# Patient Record
Sex: FEMALE | Race: WHITE | NOT HISPANIC OR LATINO
[De-identification: names, ages, dates, MRNs, and addresses within clinical notes are randomized per-mention and may not be internally consistent; named-entity substitution may affect disease eponyms.]

---

## 2021-06-08 ENCOUNTER — RESULT REVIEW (OUTPATIENT)
Age: 26
End: 2021-06-08

## 2021-06-08 ENCOUNTER — APPOINTMENT (OUTPATIENT)
Dept: HEPATOLOGY | Facility: CLINIC | Age: 26
End: 2021-06-08
Payer: COMMERCIAL

## 2021-06-08 VITALS
HEIGHT: 63 IN | TEMPERATURE: 97.5 F | OXYGEN SATURATION: 100 % | WEIGHT: 214 LBS | SYSTOLIC BLOOD PRESSURE: 101 MMHG | HEART RATE: 76 BPM | DIASTOLIC BLOOD PRESSURE: 58 MMHG | BODY MASS INDEX: 37.92 KG/M2

## 2021-06-08 PROBLEM — Z00.00 ENCOUNTER FOR PREVENTIVE HEALTH EXAMINATION: Status: ACTIVE | Noted: 2021-06-08

## 2021-06-08 PROCEDURE — 99072 ADDL SUPL MATRL&STAF TM PHE: CPT

## 2021-06-08 PROCEDURE — 99204 OFFICE O/P NEW MOD 45 MIN: CPT

## 2021-06-09 LAB
A1AT SERPL-MCNC: 117 MG/DL
ALBUMIN SERPL ELPH-MCNC: 4.8 G/DL
ALP BLD-CCNC: 60 U/L
ALT SERPL-CCNC: 88 U/L
ANION GAP SERPL CALC-SCNC: 16 MMOL/L
AST SERPL-CCNC: 53 U/L
BASOPHILS # BLD AUTO: 0.04 K/UL
BASOPHILS NFR BLD AUTO: 0.5 %
BILIRUB SERPL-MCNC: 0.5 MG/DL
BUN SERPL-MCNC: 13 MG/DL
CALCIUM SERPL-MCNC: 10.4 MG/DL
CERULOPLASMIN SERPL-MCNC: 27 MG/DL
CHLORIDE SERPL-SCNC: 101 MMOL/L
CO2 SERPL-SCNC: 22 MMOL/L
CREAT SERPL-MCNC: 0.66 MG/DL
EOSINOPHIL # BLD AUTO: 0.15 K/UL
EOSINOPHIL NFR BLD AUTO: 2 %
FERRITIN SERPL-MCNC: 144 NG/ML
GLUCOSE SERPL-MCNC: 85 MG/DL
HBV CORE IGG+IGM SER QL: NONREACTIVE
HBV SURFACE AB SER QL: NONREACTIVE
HBV SURFACE AG SER QL: NONREACTIVE
HCT VFR BLD CALC: 36 %
HCV AB SER QL: NONREACTIVE
HCV S/CO RATIO: 0.15 S/CO
HEPATITIS A IGG ANTIBODY: REACTIVE
HGB BLD-MCNC: 12.9 G/DL
IMM GRANULOCYTES NFR BLD AUTO: 0.3 %
INR PPP: 1.2 RATIO
IRON SATN MFR SERPL: 30 %
IRON SERPL-MCNC: 105 UG/DL
LYMPHOCYTES # BLD AUTO: 1.97 K/UL
LYMPHOCYTES NFR BLD AUTO: 26.7 %
MAN DIFF?: NORMAL
MCHC RBC-ENTMCNC: 33.4 PG
MCHC RBC-ENTMCNC: 35.8 GM/DL
MCV RBC AUTO: 93.3 FL
MONOCYTES # BLD AUTO: 0.52 K/UL
MONOCYTES NFR BLD AUTO: 7.1 %
NEUTROPHILS # BLD AUTO: 4.67 K/UL
NEUTROPHILS NFR BLD AUTO: 63.4 %
PLATELET # BLD AUTO: 278 K/UL
POTASSIUM SERPL-SCNC: 4.3 MMOL/L
PROT SERPL-MCNC: 7.3 G/DL
PT BLD: 14 SEC
RBC # BLD: 3.86 M/UL
RBC # FLD: 12.6 %
SODIUM SERPL-SCNC: 139 MMOL/L
TIBC SERPL-MCNC: 350 UG/DL
UIBC SERPL-MCNC: 245 UG/DL
WBC # FLD AUTO: 7.37 K/UL

## 2021-06-10 ENCOUNTER — NON-APPOINTMENT (OUTPATIENT)
Age: 26
End: 2021-06-10

## 2021-06-10 LAB
ANA SER IF-ACNC: NEGATIVE
LKM AB SER QL IF: <20.1 UNITS
MITOCHONDRIA AB SER IF-ACNC: NORMAL
SMOOTH MUSCLE AB SER QL IF: ABNORMAL
SOLUBLE LIVER IGG SER IA-ACNC: 0.9
TTG IGG SER IA-ACNC: 1.2 U/ML
TTG IGG SER IA-ACNC: NEGATIVE

## 2021-06-15 LAB
A1AT PHENOTYP SERPL-IMP: NORMAL
A1AT SERPL-MCNC: 114 MG/DL

## 2021-06-28 LAB
LYSOSOMAL ACID LIPASE INTERPRETATION: NORMAL
LYSOSOMAL ACID LIPASE: 130 CD:384473389

## 2021-07-01 ENCOUNTER — APPOINTMENT (OUTPATIENT)
Dept: HEPATOLOGY | Facility: CLINIC | Age: 26
End: 2021-07-01
Payer: COMMERCIAL

## 2021-07-01 VITALS
BODY MASS INDEX: 37.03 KG/M2 | TEMPERATURE: 98.1 F | HEART RATE: 79 BPM | SYSTOLIC BLOOD PRESSURE: 110 MMHG | RESPIRATION RATE: 14 BRPM | WEIGHT: 209 LBS | DIASTOLIC BLOOD PRESSURE: 75 MMHG | HEIGHT: 63 IN

## 2021-07-01 PROCEDURE — 91200 LIVER ELASTOGRAPHY: CPT

## 2021-07-01 PROCEDURE — 99214 OFFICE O/P EST MOD 30 MIN: CPT

## 2021-07-01 PROCEDURE — 99072 ADDL SUPL MATRL&STAF TM PHE: CPT

## 2021-09-22 ENCOUNTER — NON-APPOINTMENT (OUTPATIENT)
Age: 26
End: 2021-09-22

## 2021-10-06 ENCOUNTER — APPOINTMENT (OUTPATIENT)
Dept: HEPATOLOGY | Facility: CLINIC | Age: 26
End: 2021-10-06
Payer: COMMERCIAL

## 2021-10-06 VITALS
DIASTOLIC BLOOD PRESSURE: 77 MMHG | TEMPERATURE: 97.9 F | BODY MASS INDEX: 39.16 KG/M2 | SYSTOLIC BLOOD PRESSURE: 116 MMHG | HEIGHT: 63 IN | HEART RATE: 74 BPM | WEIGHT: 221 LBS | RESPIRATION RATE: 14 BRPM

## 2021-10-06 PROCEDURE — 99214 OFFICE O/P EST MOD 30 MIN: CPT

## 2021-10-06 PROCEDURE — 99072 ADDL SUPL MATRL&STAF TM PHE: CPT

## 2021-10-08 LAB
ALBUMIN SERPL ELPH-MCNC: 4.8 G/DL
ALP BLD-CCNC: 61 U/L
ALT SERPL-CCNC: 97 U/L
ANION GAP SERPL CALC-SCNC: 11 MMOL/L
AST SERPL-CCNC: 76 U/L
BASOPHILS # BLD AUTO: 0.02 K/UL
BASOPHILS NFR BLD AUTO: 0.3 %
BILIRUB SERPL-MCNC: 0.4 MG/DL
BUN SERPL-MCNC: 9 MG/DL
CALCIUM SERPL-MCNC: 9.7 MG/DL
CHLORIDE SERPL-SCNC: 103 MMOL/L
CHOLEST SERPL-MCNC: 79 MG/DL
CO2 SERPL-SCNC: 23 MMOL/L
CREAT SERPL-MCNC: 0.59 MG/DL
EOSINOPHIL # BLD AUTO: 0.19 K/UL
EOSINOPHIL NFR BLD AUTO: 2.7 %
ESTIMATED AVERAGE GLUCOSE: 111 MG/DL
GLUCOSE SERPL-MCNC: 107 MG/DL
HBA1C MFR BLD HPLC: 5.5 %
HCT VFR BLD CALC: 37.4 %
HDLC SERPL-MCNC: 44 MG/DL
HGB BLD-MCNC: 13.1 G/DL
IMM GRANULOCYTES NFR BLD AUTO: 0.1 %
INR PPP: 1.04 RATIO
INSULIN SERPL-MCNC: 32.6 UU/ML
LDLC SERPL CALC-MCNC: 27 MG/DL
LYMPHOCYTES # BLD AUTO: 1.97 K/UL
LYMPHOCYTES NFR BLD AUTO: 28.4 %
MAN DIFF?: NORMAL
MCHC RBC-ENTMCNC: 33.7 PG
MCHC RBC-ENTMCNC: 35 GM/DL
MCV RBC AUTO: 96.1 FL
MONOCYTES # BLD AUTO: 0.52 K/UL
MONOCYTES NFR BLD AUTO: 7.5 %
NEUTROPHILS # BLD AUTO: 4.23 K/UL
NEUTROPHILS NFR BLD AUTO: 61 %
NONHDLC SERPL-MCNC: 35 MG/DL
PLATELET # BLD AUTO: 291 K/UL
POTASSIUM SERPL-SCNC: 4.5 MMOL/L
PROT SERPL-MCNC: 7.2 G/DL
PT BLD: 12.2 SEC
RBC # BLD: 3.89 M/UL
RBC # FLD: 12.3 %
SODIUM SERPL-SCNC: 137 MMOL/L
TRIGL SERPL-MCNC: 40 MG/DL
TSH SERPL-ACNC: 1.5 UIU/ML
WBC # FLD AUTO: 6.94 K/UL

## 2021-11-23 ENCOUNTER — APPOINTMENT (OUTPATIENT)
Dept: HEPATOLOGY | Facility: CLINIC | Age: 26
End: 2021-11-23
Payer: COMMERCIAL

## 2021-11-23 VITALS
TEMPERATURE: 98.7 F | WEIGHT: 225 LBS | HEART RATE: 89 BPM | RESPIRATION RATE: 15 BRPM | OXYGEN SATURATION: 98 % | BODY MASS INDEX: 39.87 KG/M2 | SYSTOLIC BLOOD PRESSURE: 106 MMHG | DIASTOLIC BLOOD PRESSURE: 79 MMHG | HEIGHT: 63 IN

## 2021-11-23 PROCEDURE — 99072 ADDL SUPL MATRL&STAF TM PHE: CPT

## 2021-11-23 PROCEDURE — 99214 OFFICE O/P EST MOD 30 MIN: CPT

## 2022-01-19 ENCOUNTER — NON-APPOINTMENT (OUTPATIENT)
Age: 27
End: 2022-01-19

## 2022-01-19 ENCOUNTER — APPOINTMENT (OUTPATIENT)
Dept: HEPATOLOGY | Facility: CLINIC | Age: 27
End: 2022-01-19
Payer: COMMERCIAL

## 2022-01-19 VITALS
BODY MASS INDEX: 39.51 KG/M2 | DIASTOLIC BLOOD PRESSURE: 74 MMHG | HEIGHT: 63 IN | OXYGEN SATURATION: 98 % | HEART RATE: 76 BPM | TEMPERATURE: 97 F | SYSTOLIC BLOOD PRESSURE: 111 MMHG | WEIGHT: 223 LBS | RESPIRATION RATE: 15 BRPM

## 2022-01-19 PROCEDURE — 99072 ADDL SUPL MATRL&STAF TM PHE: CPT

## 2022-01-19 PROCEDURE — 99214 OFFICE O/P EST MOD 30 MIN: CPT

## 2022-01-19 RX ORDER — SEMAGLUTIDE 0.25 MG/.5ML
0.25 INJECTION, SOLUTION SUBCUTANEOUS WEEKLY
Qty: 1 | Refills: 1 | Status: DISCONTINUED | COMMUNITY
Start: 2021-11-23 | End: 2022-01-19

## 2022-01-25 ENCOUNTER — APPOINTMENT (OUTPATIENT)
Dept: HEPATOLOGY | Facility: CLINIC | Age: 27
End: 2022-01-25

## 2022-01-26 LAB
ALBUMIN SERPL ELPH-MCNC: 4.8 G/DL
ALP BLD-CCNC: 70 U/L
ALT SERPL-CCNC: 132 U/L
ANION GAP SERPL CALC-SCNC: 14 MMOL/L
AST SERPL-CCNC: 98 U/L
BASOPHILS # BLD AUTO: 0.04 K/UL
BASOPHILS NFR BLD AUTO: 0.6 %
BILIRUB SERPL-MCNC: 0.4 MG/DL
BUN SERPL-MCNC: 9 MG/DL
CALCIUM SERPL-MCNC: 9.8 MG/DL
CHLORIDE SERPL-SCNC: 103 MMOL/L
CO2 SERPL-SCNC: 22 MMOL/L
CREAT SERPL-MCNC: 0.7 MG/DL
EOSINOPHIL # BLD AUTO: 0.18 K/UL
EOSINOPHIL NFR BLD AUTO: 2.5 %
ESTIMATED AVERAGE GLUCOSE: 105 MG/DL
GLUCOSE SERPL-MCNC: 99 MG/DL
HBA1C MFR BLD HPLC: 5.3 %
HCT VFR BLD CALC: 41.9 %
HGB BLD-MCNC: 13.8 G/DL
IMM GRANULOCYTES NFR BLD AUTO: 0.3 %
INR PPP: 1.06 RATIO
INSULIN SERPL-MCNC: 21.6 UU/ML
LYMPHOCYTES # BLD AUTO: 2.51 K/UL
LYMPHOCYTES NFR BLD AUTO: 35 %
MAN DIFF?: NORMAL
MCHC RBC-ENTMCNC: 32.5 PG
MCHC RBC-ENTMCNC: 32.9 GM/DL
MCV RBC AUTO: 98.6 FL
MONOCYTES # BLD AUTO: 0.4 K/UL
MONOCYTES NFR BLD AUTO: 5.6 %
NEUTROPHILS # BLD AUTO: 4.02 K/UL
NEUTROPHILS NFR BLD AUTO: 56 %
PLATELET # BLD AUTO: 345 K/UL
POTASSIUM SERPL-SCNC: 4.2 MMOL/L
PROT SERPL-MCNC: 7.8 G/DL
PT BLD: 12.5 SEC
RBC # BLD: 4.25 M/UL
RBC # FLD: 12.9 %
SODIUM SERPL-SCNC: 139 MMOL/L
WBC # FLD AUTO: 7.17 K/UL

## 2022-02-09 ENCOUNTER — APPOINTMENT (OUTPATIENT)
Dept: INTERVENTIONAL RADIOLOGY/VASCULAR | Facility: CLINIC | Age: 27
End: 2022-02-09
Payer: COMMERCIAL

## 2022-02-09 DIAGNOSIS — Z01.818 ENCOUNTER FOR OTHER PREPROCEDURAL EXAMINATION: ICD-10-CM

## 2022-02-09 DIAGNOSIS — Z83.438 FAMILY HISTORY OF OTHER DISORDER OF LIPOPROTEIN METABOLISM AND OTHER LIPIDEMIA: ICD-10-CM

## 2022-02-09 DIAGNOSIS — Z83.79 FAMILY HISTORY OF OTHER DISEASES OF THE DIGESTIVE SYSTEM: ICD-10-CM

## 2022-02-09 DIAGNOSIS — Z78.9 OTHER SPECIFIED HEALTH STATUS: ICD-10-CM

## 2022-02-09 PROCEDURE — XXXXX: CPT | Mod: 1L

## 2022-02-14 ENCOUNTER — NON-APPOINTMENT (OUTPATIENT)
Age: 27
End: 2022-02-14

## 2022-02-14 RX ORDER — SEMAGLUTIDE 1.34 MG/ML
2 INJECTION, SOLUTION SUBCUTANEOUS
Qty: 1.5 | Refills: 0 | Status: DISCONTINUED | COMMUNITY
Start: 2021-12-03 | End: 2022-02-14

## 2022-03-14 ENCOUNTER — NON-APPOINTMENT (OUTPATIENT)
Age: 27
End: 2022-03-14

## 2022-04-26 ENCOUNTER — APPOINTMENT (OUTPATIENT)
Dept: HEPATOLOGY | Facility: CLINIC | Age: 27
End: 2022-04-26
Payer: COMMERCIAL

## 2022-04-26 VITALS
SYSTOLIC BLOOD PRESSURE: 122 MMHG | HEART RATE: 75 BPM | HEIGHT: 64 IN | BODY MASS INDEX: 37.39 KG/M2 | OXYGEN SATURATION: 98 % | TEMPERATURE: 98.3 F | DIASTOLIC BLOOD PRESSURE: 83 MMHG | RESPIRATION RATE: 16 BRPM | WEIGHT: 219 LBS

## 2022-04-26 PROCEDURE — 99214 OFFICE O/P EST MOD 30 MIN: CPT

## 2022-04-26 NOTE — ASSESSMENT
[FreeTextEntry1] : This is a 26-year-old female with past medical history of obesity, compound status for hemochromatosis, presents for follow-up of evaluation for elevated ferritin level as well as hepatic steatosis and mild iron overload on recent MRI.  She also has persistent transaminitis with normal bilirubin level.  She was further evaluated with etiological work-up for underlying chronic liver disease which was essentially unremarkable except for a mild nonspecific low titer positive test results for smooth muscle antibody (1:20).  I suspect patient has nonalcoholic fatty liver disease with ALMEIDA.  Elevated ferritin level is not uncommon in patients with fatty liver disease and likely explain this finding.  She does have significant insulin resistance as measured by cristhian IR (8)\par \par Her FibroScan is suggestive of moderate to severe hepatic steatosis without any clinically significant fibrosis.  She currently remains on Ozempic that was initiated since November serum 2022.\par \par PLAN\par \par #1.  Nonalcoholic fatty liver disease\par I have discussed with him that lifestyle modifications are crucial for management of NAFLD. I recommended gradual weight loss of 5-10% of his body weight through dietary changes and moderate intensity exercise for 20 minutes at least 3 times per week. These changes have been shown to lead to regression or even resolution of steatosis, inflammation, and even fibrosis in some patients.  Patient already has lost a few pounds since last visit.  I have encouraged her to continue in an effort to lose at least 10% of body weight, and recommended her to get involved more in exercises in addition to continuing semaglutide for her weight loss efforts.\par \par Her FibroScan revealed F0 to F1 fibrosis with moderate hepatic steatosis was noted.\par \par She currently remains on Ozempic subcutaneously 1 mg qweekly.  She is tolerating this treatment well.  She has lost about 5 to 6 pounds of weight since initiation of Ozempic.\par \par Will have follow-up labs today that will include CBC, CMP, PT/INR, insulin level, A1c level, lipid profile.  I will also recommended liver elastography prior to her next follow-up visit in 3 months.  She is awaiting a liver biopsy for establishing the baseline fibrosis status and inflammatory status.\par \par \par #2.  Elevated ferritin level\par Patient has not compound heterozygous status for hemochromatosis.  She has mildly elevated ferritin level to the range of 400 which is improved with phlebotomies ( now < 200).  Ferritin level is not uncommon in patients with nonalcoholic fatty liver disease, and likely not pathogenic condition.  Patient however has a strong family history of hemochromatosis in her father and she is concerned about it.  She will continue to remain on phlebotomy schedule to give her ferritin level below 100.\par \par Return to hepatology clinic in 3 months.\par

## 2022-04-26 NOTE — HISTORY OF PRESENT ILLNESS
[FreeTextEntry1] : History based on initial hepatology clinic visit on 8 June 2021:\par \par Ms. KELLY WHALEN a 25 year White female  presents today for  a hepatology appointment. She has been a patient  KENNA RAMIREZ .  She presents to the clinic today accompanied by her mother.\par \par This is a 25-year-old-year-old female who presents for further evaluation for elevated liver test and ferritin level.  Patient has been following at the [facility] for elevated ferritin level.  She has apparently been diagnosed with compound heterozygous for hemochromatosis (C282Y, H63D).  Patient reports that her father has been diagnosed with cirrhosis of the liver about more than 20 years ago.  Her father was also diagnosed with cirrhosis of the liver and type 2 diabetes mellitus.\par \par Patient is morbidly obese otherwise no other medical problems other than as noted above.\par \par Patient currently remains on phlebotomy protocol every 4 weekly and has already performed about 6-7 infections over the last 6 months.  Her ferritin level was in the range of around 400 to initiation of phlebotomies but has now around 100.  Recent blood test results March 2, 2020 was reviewed.  And now his fibrosis is a revealing moderate steatosis, and F0 fibrosis.\par \par Patient also had an MRI done which revealed moderate to severe steatosis and mild increased iron deposits.  No evidence of cirrhosis was noted.\par \par Interval history dated July 1, 2021\par \awa Gonzales presents for hepatology follow-up appointment after being last seen in hepatology clinic on July 2021.  Since last seen patient has lost about 5 pounds of weight with diet and exercise.  She otherwise has no new symptoms today.\par \par We reviewed all the test results since last clinic visit.  Laboratory test results from 2 May 2021 revealed normal CBC, minimal elevation of INR (1.2, suspect presentation under).  Review of his liver function test revealed total bilirubin 1.5, AST 53, ALT 88, alkaline phosphatase 60.  Serum creatinine was 0.66 mg/dL.  Serum sodium 139, potassium 3.3 mmol/L.\par Etiological work-up for underlying chronic liver disease were essentially unremarkable.  Specifically viral serology for hepatitis B were negative including hepatitis B surface antigen, hepatitis B core antibody total, hepatitis B surface antibody.  Hepatitis A IgG antibody was positive suggesting immunity.  Please with transglutaminase IgG antibody was negative.  Autoimmune markers were minimally positive for smooth muscle antibody (1;20, likely nonspecific) but other autoimmune markers such as anti-LK M, mitochondrial antibody, SURINDER, soluble liver antigen antibody were negative.  Additionally alpha-1 antitrypsin level was within normal range.  Lysosomal acid lipase activity was within normal range.  Ferritin level has down trended 244 ng/mL.  \par Patient currently remains on phlebotomy schedule every 3 months.\par Patient had a FibroScan done on July 1, 2021 which revealed median seal with speed of 1.45 m/s which corresponds to a median liver stiffness score of 6.3 kPa and a CAP score of 355 dB/m.  This is consistent with F0 to F1 suggesting none to mild fibrosis and S3, stage III steatosis which is equivalent to about 67% or more of steatosis.\par \par Interval history dated October 6, 2021\awa Gonzales presents for a hepatology follow-up appointment today after being last seen in the hepatology clinic on July 1, 2021.  Since last seen he reports he has been doing well.  However she has gained about 11 pounds of weight since July despite being on diet control.  She reports that she has been trying to cut down her calories and also regularly exercising.\par She also continues to do phlebotomies every 6 weeks due to an elevated ferritin level.  She has not yet vaccinated for hepatitis B and apparently has scheduled an appointment with her PCP.  She is immune to hepatitis A.\par Today, on her follow-up visit she wanted to know if she would be a candidate for any pharmacotherapy.  \par \par Interval history dated November 23, 2021\par steven Patient presents for hepatology follow-up appointment after being last seen in hepatology clinic on October 6, 2021.  Since last seen patient has gained 4 pounds of weight.  She has not been able to do regular exercises and has not been able to maintain a " restricted diet recommendations".  She had 1 session of phlebotomy since last visit.\par Labs reviewed from October 8, 2021 revealed mild dyslipidemia with HDL of 44 mg/dL otherwise normal triglyceride, cholesterol, LDL.  Liver tests revealed persistent elevation with AST 76, ALT 97, alkaline phosphatase 61, total bili 1.4 mg/dL.  She also had additional blood test done on October 29, 2021 which revealed , , alkaline phosphatase 59, total bilirubin 0.5 mg/dL.\par Her serum insulin level was 32.6 microunits/mL.  Fasting blood sugar of 106.  Her MOMA insulin resistance level was 8 suggesting significant insulin resistance.  Her A1c was 5.5%.\par \par Interval history dated January 19th, 2022:\par \par Ms. Whalen presents to clinic today for follow up visit.  She has no complaints at this time.  She was initiated on Ozempic at last visit and has been tolerating it well at 0.25 mg qweekly.  She has lost about 2 lbs since last visit. \par \par There are no new labs to review.  She had her bloodwork drawn earlier today and the results are not back at this time.\par \par Interval history dated April 26, 2022:\par Ms. Whalen presents for hepatology follow-up appointment.  She was last seen in the hepatology clinic on January 19, 2022.  She has been initiated on Ozempic and the dose was increased gradually, and since February 20, 2022, she has been on 1 mg subcutaneously once weekly.  She has been tolerating well.  She has lost about 4 pounds of weight since initiation of Ozempic.  She has not been able to do a strict exercise regimen as of yet.  But she is hopeful that with the weather changing she would be more involved in outdoor exercises.\par We reviewed her labs from 19 January 2022.  This revealed INR 1.06, unremarkable CBC, insulin 21.6 microunits/mL.  Her liver tests were elevated at the time with the total bilirubin 0.4 mg/dL, AST 98, , alkaline phosphatase 70 units/L.  Her hemoglobin A1c was 5.3%.\par She has yet to schedule her liver biopsy.\par

## 2022-04-26 NOTE — PHYSICAL EXAM
[General Appearance - Alert] : alert [General Appearance - In No Acute Distress] : in no acute distress [Sclera] : the sclera and conjunctiva were normal [PERRL With Normal Accommodation] : pupils were equal in size, round, and reactive to light [Extraocular Movements] : extraocular movements were intact [Outer Ear] : the ears and nose were normal in appearance [Oropharynx] : the oropharynx was normal [Neck Appearance] : the appearance of the neck was normal [Neck Cervical Mass (___cm)] : no neck mass was observed [Jugular Venous Distention Increased] : there was no jugular-venous distention [Thyroid Diffuse Enlargement] : the thyroid was not enlarged [Thyroid Nodule] : there were no palpable thyroid nodules [Auscultation Breath Sounds / Voice Sounds] : lungs were clear to auscultation bilaterally [Heart Rate And Rhythm] : heart rate was normal and rhythm regular [Heart Sounds] : normal S1 and S2 [Heart Sounds Gallop] : no gallops [Murmurs] : no murmurs [Heart Sounds Pericardial Friction Rub] : no pericardial rub [Bowel Sounds] : normal bowel sounds [Abdomen Soft] : soft [Abdomen Tenderness] : non-tender [] : no hepato-splenomegaly [Abdomen Mass (___ Cm)] : no abdominal mass palpated [Deep Tendon Reflexes (DTR)] : deep tendon reflexes were 2+ and symmetric [Sensation] : the sensory exam was normal to light touch and pinprick [No Focal Deficits] : no focal deficits [Oriented To Time, Place, And Person] : oriented to person, place, and time [Impaired Insight] : insight and judgment were intact [Affect] : the affect was normal

## 2022-04-29 LAB
ALBUMIN SERPL ELPH-MCNC: 4.5 G/DL
ALP BLD-CCNC: 73 U/L
ALT SERPL-CCNC: 107 U/L
ANION GAP SERPL CALC-SCNC: 12 MMOL/L
AST SERPL-CCNC: 83 U/L
BASOPHILS # BLD AUTO: 0.04 K/UL
BASOPHILS NFR BLD AUTO: 0.6 %
BILIRUB SERPL-MCNC: 0.4 MG/DL
BUN SERPL-MCNC: 10 MG/DL
CALCIUM SERPL-MCNC: 9.3 MG/DL
CHLORIDE SERPL-SCNC: 105 MMOL/L
CHOLEST SERPL-MCNC: 70 MG/DL
CO2 SERPL-SCNC: 20 MMOL/L
CREAT SERPL-MCNC: 0.58 MG/DL
EGFR: 130 ML/MIN/1.73M2
EOSINOPHIL # BLD AUTO: 0.32 K/UL
EOSINOPHIL NFR BLD AUTO: 4.7 %
ESTIMATED AVERAGE GLUCOSE: 111 MG/DL
GLUCOSE SERPL-MCNC: 101 MG/DL
HBA1C MFR BLD HPLC: 5.5 %
HCT VFR BLD CALC: 41 %
HDLC SERPL-MCNC: 39 MG/DL
HGB BLD-MCNC: 13.9 G/DL
IMM GRANULOCYTES NFR BLD AUTO: 0.4 %
INR PPP: 1.02 RATIO
INSULIN SERPL-MCNC: 28.2 UU/ML
LDLC SERPL CALC-MCNC: 24 MG/DL
LYMPHOCYTES # BLD AUTO: 1.81 K/UL
LYMPHOCYTES NFR BLD AUTO: 26.9 %
MAN DIFF?: NORMAL
MCHC RBC-ENTMCNC: 32.5 PG
MCHC RBC-ENTMCNC: 33.9 GM/DL
MCV RBC AUTO: 95.8 FL
MONOCYTES # BLD AUTO: 0.43 K/UL
MONOCYTES NFR BLD AUTO: 6.4 %
NEUTROPHILS # BLD AUTO: 4.11 K/UL
NEUTROPHILS NFR BLD AUTO: 61 %
NONHDLC SERPL-MCNC: 31 MG/DL
PLATELET # BLD AUTO: 257 K/UL
POTASSIUM SERPL-SCNC: 4.6 MMOL/L
PROT SERPL-MCNC: 7.2 G/DL
PT BLD: 12 SEC
RBC # BLD: 4.28 M/UL
RBC # FLD: 13.1 %
SODIUM SERPL-SCNC: 137 MMOL/L
TRIGL SERPL-MCNC: 36 MG/DL
WBC # FLD AUTO: 6.74 K/UL

## 2022-07-26 ENCOUNTER — APPOINTMENT (OUTPATIENT)
Dept: HEPATOLOGY | Facility: CLINIC | Age: 27
End: 2022-07-26

## 2022-07-26 ENCOUNTER — NON-APPOINTMENT (OUTPATIENT)
Age: 27
End: 2022-07-26

## 2022-07-26 VITALS
OXYGEN SATURATION: 99 % | HEART RATE: 72 BPM | HEIGHT: 64 IN | DIASTOLIC BLOOD PRESSURE: 77 MMHG | BODY MASS INDEX: 37.56 KG/M2 | WEIGHT: 220 LBS | SYSTOLIC BLOOD PRESSURE: 112 MMHG | TEMPERATURE: 98.1 F | RESPIRATION RATE: 16 BRPM

## 2022-07-26 PROCEDURE — 91200 LIVER ELASTOGRAPHY: CPT

## 2022-07-26 PROCEDURE — 99214 OFFICE O/P EST MOD 30 MIN: CPT

## 2022-07-26 RX ORDER — SEMAGLUTIDE 1.34 MG/ML
4 INJECTION, SOLUTION SUBCUTANEOUS
Qty: 1 | Refills: 2 | Status: DISCONTINUED | COMMUNITY
Start: 2022-02-14 | End: 2022-07-26

## 2022-07-26 NOTE — HISTORY OF PRESENT ILLNESS
[FreeTextEntry1] : History based on initial hepatology clinic visit on 8 June 2021:\par \par Ms. KELLY WHALEN a 25 year White female  presents today for  a hepatology appointment. She has been a patient  KENNA RAMIREZ .  She presents to the clinic today accompanied by her mother.\par \par This is a 25-year-old-year-old female who presents for further evaluation for elevated liver test and ferritin level.  Patient has been following at the [facility] for elevated ferritin level.  She has apparently been diagnosed with compound heterozygous for hemochromatosis (C282Y, H63D).  Patient reports that her father has been diagnosed with cirrhosis of the liver about more than 20 years ago.  Her father was also diagnosed with cirrhosis of the liver and type 2 diabetes mellitus.\par \par Patient is morbidly obese otherwise no other medical problems other than as noted above.\par \par Patient currently remains on phlebotomy protocol every 4 weekly and has already performed about 6-7 infections over the last 6 months.  Her ferritin level was in the range of around 400 to initiation of phlebotomies but has now around 100.  Recent blood test results March 2, 2020 was reviewed.  And now his fibrosis is a revealing moderate steatosis, and F0 fibrosis.\par \par Patient also had an MRI done which revealed moderate to severe steatosis and mild increased iron deposits.  No evidence of cirrhosis was noted.\par \par Interval history dated July 1, 2021\par \awa Gonzales presents for hepatology follow-up appointment after being last seen in hepatology clinic on July 2021.  Since last seen patient has lost about 5 pounds of weight with diet and exercise.  She otherwise has no new symptoms today.\par \par We reviewed all the test results since last clinic visit.  Laboratory test results from 2 May 2021 revealed normal CBC, minimal elevation of INR (1.2, suspect presentation under).  Review of his liver function test revealed total bilirubin 1.5, AST 53, ALT 88, alkaline phosphatase 60.  Serum creatinine was 0.66 mg/dL.  Serum sodium 139, potassium 3.3 mmol/L.\par Etiological work-up for underlying chronic liver disease were essentially unremarkable.  Specifically viral serology for hepatitis B were negative including hepatitis B surface antigen, hepatitis B core antibody total, hepatitis B surface antibody.  Hepatitis A IgG antibody was positive suggesting immunity.  Please with transglutaminase IgG antibody was negative.  Autoimmune markers were minimally positive for smooth muscle antibody (1;20, likely nonspecific) but other autoimmune markers such as anti-LK M, mitochondrial antibody, SURINDER, soluble liver antigen antibody were negative.  Additionally alpha-1 antitrypsin level was within normal range.  Lysosomal acid lipase activity was within normal range.  Ferritin level has down trended 244 ng/mL.  \par Patient currently remains on phlebotomy schedule every 3 months.\par Patient had a FibroScan done on July 1, 2021 which revealed median seal with speed of 1.45 m/s which corresponds to a median liver stiffness score of 6.3 kPa and a CAP score of 355 dB/m.  This is consistent with F0 to F1 suggesting none to mild fibrosis and S3, stage III steatosis which is equivalent to about 67% or more of steatosis.\par \par Interval history dated October 6, 2021\awa Gonzales presents for a hepatology follow-up appointment today after being last seen in the hepatology clinic on July 1, 2021.  Since last seen he reports he has been doing well.  However she has gained about 11 pounds of weight since July despite being on diet control.  She reports that she has been trying to cut down her calories and also regularly exercising.\par She also continues to do phlebotomies every 6 weeks due to an elevated ferritin level.  She has not yet vaccinated for hepatitis B and apparently has scheduled an appointment with her PCP.  She is immune to hepatitis A.\par Today, on her follow-up visit she wanted to know if she would be a candidate for any pharmacotherapy.  \par \par Interval history dated November 23, 2021\par steven Patient presents for hepatology follow-up appointment after being last seen in hepatology clinic on October 6, 2021.  Since last seen patient has gained 4 pounds of weight.  She has not been able to do regular exercises and has not been able to maintain a " restricted diet recommendations".  She had 1 session of phlebotomy since last visit.\par Labs reviewed from October 8, 2021 revealed mild dyslipidemia with HDL of 44 mg/dL otherwise normal triglyceride, cholesterol, LDL.  Liver tests revealed persistent elevation with AST 76, ALT 97, alkaline phosphatase 61, total bili 1.4 mg/dL.  She also had additional blood test done on October 29, 2021 which revealed , , alkaline phosphatase 59, total bilirubin 0.5 mg/dL.\par Her serum insulin level was 32.6 microunits/mL.  Fasting blood sugar of 106.  Her MOMA insulin resistance level was 8 suggesting significant insulin resistance.  Her A1c was 5.5%.\par \par Interval history dated January 19th, 2022:\par \par Ms. Whalen presents to clinic today for follow up visit.  She has no complaints at this time.  She was initiated on Ozempic at last visit and has been tolerating it well at 0.25 mg qweekly.  She has lost about 2 lbs since last visit. \par \par There are no new labs to review.  She had her bloodwork drawn earlier today and the results are not back at this time.\par \par Interval history dated April 26, 2022:\par Ms. Whalen presents for hepatology follow-up appointment.  She was last seen in the hepatology clinic on January 19, 2022.  She has been initiated on Ozempic and the dose was increased gradually, and since February 20, 2022, she has been on 1 mg subcutaneously once weekly.  She has been tolerating well.  She has lost about 4 pounds of weight since initiation of Ozempic.  She has not been able to do a strict exercise regimen as of yet.  But she is hopeful that with the weather changing she would be more involved in outdoor exercises.\par We reviewed her labs from 19 January 2022.  This revealed INR 1.06, unremarkable CBC, insulin 21.6 microunits/mL.  Her liver tests were elevated at the time with the total bilirubin 0.4 mg/dL, AST 98, , alkaline phosphatase 70 units/L.  Her hemoglobin A1c was 5.3%.\par She has yet to schedule her liver biopsy.\par \par Interval hx dated July 26, 2022:\par \par Ms. Whalen returns for a follow up. She reports she has been tolerating Ozempic 1 mg sq weekly. She has been walking at least 45 minutes a day. She has also changed her diet, and has cut down on the proportion of her diet by 50%. She has lost about 5 pounds since January. \par \par Follow up FibroScan shows CAP score of 357 and LSM of 9.1 from July 26, 2022.   Labs reviewed as underneath from April 2022. \par \par \par \par

## 2022-07-26 NOTE — ASSESSMENT
[FreeTextEntry1] : This is a 26-year-old female with past medical history of obesity, compound status for hemochromatosis, presents for follow-up of evaluation for elevated ferritin level as well as hepatic steatosis and mild iron overload on recent MRI.  She also has persistent transaminitis with normal bilirubin level.  She was further evaluated with etiological work-up for underlying chronic liver disease which was essentially unremarkable except for a mild nonspecific low titer positive test results for smooth muscle antibody (1:20).  I suspect patient has nonalcoholic fatty liver disease with ALMEIDA.  Elevated ferritin level is not uncommon in patients with fatty liver disease and likely explain this finding.  She does have significant insulin resistance as measured by cristhian IR (8)\par \par Her FibroScan is suggestive of moderate to severe hepatic steatosis without any clinically significant fibrosis. Follow up FibroScan shows CAP score of 357 and LSM of 9.1 from July 26, 2022.   She currently uptitrated to  Ozempic 1 mg weekly that was initiated since November serum 2022. She has been still struggling to lose weight.\par \par PLAN\par \par #1.  Nonalcoholic fatty liver disease\par I have discussed with him that lifestyle modifications are crucial for management of NAFLD. I recommended gradual weight loss of 5-10% of his body weight through dietary changes and moderate intensity exercise for 20 minutes at least 3 times per week. These changes have been shown to lead to regression or even resolution of steatosis, inflammation, and even fibrosis in some patients.  Patient already has lost a few pounds since last visit.  I have encouraged her to continue in an effort to lose at least 10% of body weight, and recommended her to get involved more in exercises in addition to continuing semaglutide for her weight loss efforts.\par \par Her FibroScan revealed F0 to F1 fibrosis with moderate hepatic steatosis was noted.\par \par She currently remains on Ozempic subcutaneously 1 mg qweekly.  She is tolerating this treatment well.  She has lost about 5 to 6 pounds of weight since initiation of Ozempic. We will plan to increase top 2 mg sq weekly pending  insurance authorization.\par \par Will have follow-up labs today that will include CBC, CMP, PT/INR, insulin level, A1c level, lipid profile.  I will also recommended liver elastography prior to her next follow-up visit in 3 months.  She is awaiting a liver biopsy for establishing the baseline fibrosis status and inflammatory status. Still awaiting scheduling. \par \par \par #2.  Elevated ferritin level\par Patient has not compound heterozygous status for hemochromatosis.  She has mildly elevated ferritin level to the range of 400 which is improved with phlebotomies ( now < 200).  Ferritin level is not uncommon in patients with nonalcoholic fatty liver disease, and likely not pathogenic condition.  Patient however has a strong family history of hemochromatosis in her father and she is concerned about it.  She will continue to remain on phlebotomy schedule to give her ferritin level below 100.\par \par Return to hepatology clinic in 3 months.\par

## 2022-07-29 LAB
ALBUMIN SERPL ELPH-MCNC: 4.6 G/DL
ALP BLD-CCNC: 71 U/L
ALT SERPL-CCNC: 94 U/L
ANION GAP SERPL CALC-SCNC: 14 MMOL/L
AST SERPL-CCNC: 78 U/L
BASOPHILS # BLD AUTO: 0.05 K/UL
BASOPHILS NFR BLD AUTO: 0.7 %
BILIRUB SERPL-MCNC: 0.4 MG/DL
BUN SERPL-MCNC: 8 MG/DL
CALCIUM SERPL-MCNC: 9.5 MG/DL
CHLORIDE SERPL-SCNC: 101 MMOL/L
CHOLEST SERPL-MCNC: 76 MG/DL
CO2 SERPL-SCNC: 23 MMOL/L
CREAT SERPL-MCNC: 0.62 MG/DL
EGFR: 127 ML/MIN/1.73M2
EOSINOPHIL # BLD AUTO: 0.28 K/UL
EOSINOPHIL NFR BLD AUTO: 3.9 %
ESTIMATED AVERAGE GLUCOSE: 108 MG/DL
FERRITIN SERPL-MCNC: 77 NG/ML
GLUCOSE SERPL-MCNC: 79 MG/DL
HBA1C MFR BLD HPLC: 5.4 %
HCT VFR BLD CALC: 39.7 %
HDLC SERPL-MCNC: 42 MG/DL
HGB BLD-MCNC: 13.3 G/DL
IMM GRANULOCYTES NFR BLD AUTO: 0.6 %
INR PPP: 1.06 RATIO
INSULIN SERPL-MCNC: 29.6 UU/ML
IRON SATN MFR SERPL: 34 %
IRON SERPL-MCNC: 124 UG/DL
LDLC SERPL CALC-MCNC: 26 MG/DL
LYMPHOCYTES # BLD AUTO: 2.07 K/UL
LYMPHOCYTES NFR BLD AUTO: 28.8 %
MAN DIFF?: NORMAL
MCHC RBC-ENTMCNC: 31.9 PG
MCHC RBC-ENTMCNC: 33.5 GM/DL
MCV RBC AUTO: 95.2 FL
MONOCYTES # BLD AUTO: 0.42 K/UL
MONOCYTES NFR BLD AUTO: 5.8 %
NEUTROPHILS # BLD AUTO: 4.32 K/UL
NEUTROPHILS NFR BLD AUTO: 60.2 %
NONHDLC SERPL-MCNC: 34 MG/DL
PLATELET # BLD AUTO: 288 K/UL
POTASSIUM SERPL-SCNC: 4.3 MMOL/L
PROT SERPL-MCNC: 7.4 G/DL
PT BLD: 12.5 SEC
RBC # BLD: 4.17 M/UL
RBC # FLD: 12.7 %
SODIUM SERPL-SCNC: 138 MMOL/L
TIBC SERPL-MCNC: 359 UG/DL
TRIGL SERPL-MCNC: 40 MG/DL
UIBC SERPL-MCNC: 236 UG/DL
WBC # FLD AUTO: 7.18 K/UL

## 2022-08-24 ENCOUNTER — RESULT REVIEW (OUTPATIENT)
Age: 27
End: 2022-08-24

## 2022-09-01 ENCOUNTER — NON-APPOINTMENT (OUTPATIENT)
Age: 27
End: 2022-09-01

## 2022-10-27 ENCOUNTER — APPOINTMENT (OUTPATIENT)
Dept: HEPATOLOGY | Facility: CLINIC | Age: 27
End: 2022-10-27

## 2022-10-27 VITALS
TEMPERATURE: 97.8 F | WEIGHT: 219 LBS | BODY MASS INDEX: 37.85 KG/M2 | OXYGEN SATURATION: 98 % | HEART RATE: 77 BPM | DIASTOLIC BLOOD PRESSURE: 74 MMHG | RESPIRATION RATE: 16 BRPM | HEIGHT: 63.85 IN | SYSTOLIC BLOOD PRESSURE: 105 MMHG

## 2022-10-27 PROCEDURE — 99214 OFFICE O/P EST MOD 30 MIN: CPT

## 2022-10-27 PROCEDURE — 99072 ADDL SUPL MATRL&STAF TM PHE: CPT

## 2022-10-27 NOTE — HISTORY OF PRESENT ILLNESS
[FreeTextEntry1] : History based on initial hepatology clinic visit on 8 June 2021:\par \par Ms. KELLY WHALEN a 25 year White female  presents today for  a hepatology appointment. She has been a patient  KENNA RAMIREZ .  She presents to the clinic today accompanied by her mother.\par \par This is a 25-year-old-year-old female who presents for further evaluation for elevated liver test and ferritin level.  Patient has been following at the [facility] for elevated ferritin level.  She has apparently been diagnosed with compound heterozygous for hemochromatosis (C282Y, H63D).  Patient reports that her father has been diagnosed with cirrhosis of the liver about more than 20 years ago.  Her father was also diagnosed with cirrhosis of the liver and type 2 diabetes mellitus.\par \par Patient is morbidly obese otherwise no other medical problems other than as noted above.\par \par Patient currently remains on phlebotomy protocol every 4 weekly and has already performed about 6-7 infections over the last 6 months.  Her ferritin level was in the range of around 400 to initiation of phlebotomies but has now around 100.  Recent blood test results March 2, 2020 was reviewed.  And now his fibrosis is a revealing moderate steatosis, and F0 fibrosis.\par \par Patient also had an MRI done which revealed moderate to severe steatosis and mild increased iron deposits.  No evidence of cirrhosis was noted.\par \par Interval history dated July 1, 2021\par \awa Gonzales presents for hepatology follow-up appointment after being last seen in hepatology clinic on July 2021.  Since last seen patient has lost about 5 pounds of weight with diet and exercise.  She otherwise has no new symptoms today.\par \par We reviewed all the test results since last clinic visit.  Laboratory test results from 2 May 2021 revealed normal CBC, minimal elevation of INR (1.2, suspect presentation under).  Review of his liver function test revealed total bilirubin 1.5, AST 53, ALT 88, alkaline phosphatase 60.  Serum creatinine was 0.66 mg/dL.  Serum sodium 139, potassium 3.3 mmol/L.\par Etiological work-up for underlying chronic liver disease were essentially unremarkable.  Specifically viral serology for hepatitis B were negative including hepatitis B surface antigen, hepatitis B core antibody total, hepatitis B surface antibody.  Hepatitis A IgG antibody was positive suggesting immunity.  Please with transglutaminase IgG antibody was negative.  Autoimmune markers were minimally positive for smooth muscle antibody (1;20, likely nonspecific) but other autoimmune markers such as anti-LK M, mitochondrial antibody, SURINDER, soluble liver antigen antibody were negative.  Additionally alpha-1 antitrypsin level was within normal range.  Lysosomal acid lipase activity was within normal range.  Ferritin level has down trended 244 ng/mL.  \par Patient currently remains on phlebotomy schedule every 3 months.\par Patient had a FibroScan done on July 1, 2021 which revealed median seal with speed of 1.45 m/s which corresponds to a median liver stiffness score of 6.3 kPa and a CAP score of 355 dB/m.  This is consistent with F0 to F1 suggesting none to mild fibrosis and S3, stage III steatosis which is equivalent to about 67% or more of steatosis.\par \par Interval history dated October 6, 2021\awa Gonzales presents for a hepatology follow-up appointment today after being last seen in the hepatology clinic on July 1, 2021.  Since last seen he reports he has been doing well.  However she has gained about 11 pounds of weight since July despite being on diet control.  She reports that she has been trying to cut down her calories and also regularly exercising.\par She also continues to do phlebotomies every 6 weeks due to an elevated ferritin level.  She has not yet vaccinated for hepatitis B and apparently has scheduled an appointment with her PCP.  She is immune to hepatitis A.\par Today, on her follow-up visit she wanted to know if she would be a candidate for any pharmacotherapy.  \par \par Interval history dated November 23, 2021\par steven Patient presents for hepatology follow-up appointment after being last seen in hepatology clinic on October 6, 2021.  Since last seen patient has gained 4 pounds of weight.  She has not been able to do regular exercises and has not been able to maintain a " restricted diet recommendations".  She had 1 session of phlebotomy since last visit.\par Labs reviewed from October 8, 2021 revealed mild dyslipidemia with HDL of 44 mg/dL otherwise normal triglyceride, cholesterol, LDL.  Liver tests revealed persistent elevation with AST 76, ALT 97, alkaline phosphatase 61, total bili 1.4 mg/dL.  She also had additional blood test done on October 29, 2021 which revealed , , alkaline phosphatase 59, total bilirubin 0.5 mg/dL.\par Her serum insulin level was 32.6 microunits/mL.  Fasting blood sugar of 106.  Her MOMA insulin resistance level was 8 suggesting significant insulin resistance.  Her A1c was 5.5%.\par \par Interval history dated January 19th, 2022:\par \par Ms. Whalen presents to clinic today for follow up visit.  She has no complaints at this time.  She was initiated on Ozempic at last visit and has been tolerating it well at 0.25 mg qweekly.  She has lost about 2 lbs since last visit. \par \par There are no new labs to review.  She had her bloodwork drawn earlier today and the results are not back at this time.\par \par Interval history dated April 26, 2022:\par Ms. Whalen presents for hepatology follow-up appointment.  She was last seen in the hepatology clinic on January 19, 2022.  She has been initiated on Ozempic and the dose was increased gradually, and since February 20, 2022, she has been on 1 mg subcutaneously once weekly.  She has been tolerating well.  She has lost about 4 pounds of weight since initiation of Ozempic.  She has not been able to do a strict exercise regimen as of yet.  But she is hopeful that with the weather changing she would be more involved in outdoor exercises.\par We reviewed her labs from 19 January 2022.  This revealed INR 1.06, unremarkable CBC, insulin 21.6 microunits/mL.  Her liver tests were elevated at the time with the total bilirubin 0.4 mg/dL, AST 98, , alkaline phosphatase 70 units/L.  Her hemoglobin A1c was 5.3%.\par She has yet to schedule her liver biopsy.\par \par Interval hx dated July 26, 2022:\par \par Ms. Whalen returns for a follow up. She reports she has been tolerating Ozempic 1 mg sq weekly. She has been walking at least 45 minutes a day. She has also changed her diet, and has cut down on the proportion of her diet by 50%. She has lost about 5 pounds since January. \par \par Follow up FibroScan shows CAP score of 357 and LSM of 9.1 from July 26, 2022.   Labs reviewed as underneath from April 2022. \par \par Interval hx dated October 27, 2022:\par \par Ms. Whalen returns for follow-up visit. She states that she is tolerating the 1mg Ozempic (2mg is on backorder). She complains of lack of appetite but is still struggling to lose weight. She is walking and bike riding for exercise. She denies N/V/D/C.  She denies periods of confusion, pruritus, abdominal pain. She still hasn't had liver biopsy which was recommended during her last visit. She is having phlebotomy procedures about every 8 weeks for high Ferritin level and hemochromatosis (compound heterozygous for C282Y and H63D).\par \par We reviewed her prior blood test results from July 29, 2022 which revealed unremarkable CBC, total bilirubin 0.4 mg/dL, AST 78, ALT 94, alkaline phosphatase 71.  She continues to have persistent elevation of liver test despite being on Ozempic with some weight loss over the last 8 to 9 months.  Her lipid profile is essentially within normal range although HDL was 42 mg/dL.  Iron profile revealed serum iron 124 mcg/dL, TIBC 359 mcg/dL, percentage saturation 34% and ferritin level 77 ng/mL.  Her serum insulin level was 29.6.  INR was 1.06.  Hemoglobin A1c was 5.4%.\par \par Her last FibroScan was from July 26, 2022 which revealed a CAP score of 357 dB/m and liver stiffness score of 9.1 kPa which is suggestive of S3–stage III steatosis, F2–moderate fibrosis.\par \par \par

## 2022-10-27 NOTE — ASSESSMENT
[FreeTextEntry1] : This is a 26-year-old female with past medical history of obesity, compound heterozygous for hemochromatosis mutation (C282Y, H63D), presents for follow-up.  She also has history of elevated ferritin level as well as hepatic steatosis and mild iron overload on recent MRI.  She has persistent transaminitis with normal bilirubin level despite being on Ozempic (being prescribed for weight loss).  She was further evaluated with etiological work-up for underlying chronic liver disease which was essentially unremarkable except for a mild nonspecific low titer positive test results for smooth muscle antibody (1:20).  \par I suspect patient has nonalcoholic fatty liver disease with ALMEIDA.  Elevated ferritin level is not uncommon in patients with fatty liver disease and likely explain this finding.  However she does have compound heterozygous for hemochromatosis which may explain her high ferritin level in the past.  She does have significant insulin resistance as measured by cristhian IR (8)\par \par Her FibroScan is suggestive of moderate to severe hepatic steatosis without any clinically significant fibrosis. Follow up FibroScan shows CAP score of 357 and LSM of 9.1 from July 26, 2022.   She currently uptitrated to  Ozempic 1 mg weekly that was initiated since November serum 2022. She has been still struggling to lose weight.\par \par PLAN\par \par #1.  Nonalcoholic fatty liver disease\par I have discussed with her that lifestyle modifications are crucial for management of NAFLD. I recommended gradual weight loss of 5-10% of his body weight through dietary changes and moderate intensity exercise for 20 minutes at least 3 times per week. These changes have been shown to lead to regression or even resolution of steatosis, inflammation, and even fibrosis in some patients.  Patient already has lost a few pounds since last visit.  I have encouraged her to continue in an effort to lose at least 10% of body weight, and recommended her to get involved more in exercises in addition to continuing semaglutide for her weight loss efforts.\par \par Her FibroScan revealed F2 fibrosis with moderate hepatic steatosis was noted.\par \par She currently remains on Ozempic subcutaneously 1 mg q weekly.  She is tolerating this treatment well.  She has lost about 5 to 6 pounds of weight since initiation of Ozempic. We will plan to increase to 2 mg sq weekly pending  pharmacy availability (currently Ozempic is on backorder)\par \par Will have follow-up labs today that will include CBC, CMP, PT/INR, insulin level, lipid profile.\par \par She is awaiting a liver biopsy for establishing the baseline fibrosis status.  In addition patient has persistent elevation of liver test despite being on Ozempic and she also had this compound heterozygous status for hemochromatosis and I am expecting liver biopsy will throw some light especially to rule out any evidence of iron overload in her liver.  Still awaiting scheduling.\par \par \par #2.  Elevated ferritin level\par Patient has not compound heterozygous status for hemochromatosis.  She has mildly elevated ferritin level to the range of 400 which is improved with phlebotomies ( now < 100).  Ferritin level is not uncommon in patients with nonalcoholic fatty liver disease, and likely not pathogenic condition.  Patient however has a strong family history of hemochromatosis (father side) and she is concerned about it.  She will continue to remain on phlebotomy schedule to give her ferritin level below 100.\par \par Return to hepatology clinic in 3 months.\par

## 2022-10-27 NOTE — PHYSICAL EXAM
[General Appearance - Alert] : alert [General Appearance - In No Acute Distress] : in no acute distress [Sclera] : the sclera and conjunctiva were normal [PERRL With Normal Accommodation] : pupils were equal in size, round, and reactive to light [Extraocular Movements] : extraocular movements were intact [Outer Ear] : the ears and nose were normal in appearance [Oropharynx] : the oropharynx was normal [Neck Appearance] : the appearance of the neck was normal [Neck Cervical Mass (___cm)] : no neck mass was observed [Jugular Venous Distention Increased] : there was no jugular-venous distention [Thyroid Diffuse Enlargement] : the thyroid was not enlarged [Thyroid Nodule] : there were no palpable thyroid nodules [Auscultation Breath Sounds / Voice Sounds] : lungs were clear to auscultation bilaterally [Heart Rate And Rhythm] : heart rate was normal and rhythm regular [Heart Sounds] : normal S1 and S2 [Heart Sounds Gallop] : no gallops [Murmurs] : no murmurs [Heart Sounds Pericardial Friction Rub] : no pericardial rub [Bowel Sounds] : normal bowel sounds [Abdomen Soft] : soft [Abdomen Tenderness] : non-tender [] : no hepato-splenomegaly [Abdomen Mass (___ Cm)] : no abdominal mass palpated [Abnormal Walk] : normal gait [Nail Clubbing] : no clubbing  or cyanosis of the fingernails [Musculoskeletal - Swelling] : no joint swelling seen [Motor Tone] : muscle strength and tone were normal [Deep Tendon Reflexes (DTR)] : deep tendon reflexes were 2+ and symmetric [Sensation] : the sensory exam was normal to light touch and pinprick [No Focal Deficits] : no focal deficits [Oriented To Time, Place, And Person] : oriented to person, place, and time [Impaired Insight] : insight and judgment were intact [Affect] : the affect was normal

## 2022-10-31 LAB
ALBUMIN SERPL ELPH-MCNC: 4.6 G/DL
ALP BLD-CCNC: 62 U/L
ALT SERPL-CCNC: 117 U/L
ANION GAP SERPL CALC-SCNC: 13 MMOL/L
AST SERPL-CCNC: 92 U/L
BASOPHILS # BLD AUTO: 0.04 K/UL
BASOPHILS NFR BLD AUTO: 0.6 %
BILIRUB SERPL-MCNC: 0.4 MG/DL
BUN SERPL-MCNC: 8 MG/DL
CALCIUM SERPL-MCNC: 9.5 MG/DL
CHLORIDE SERPL-SCNC: 106 MMOL/L
CHOLEST SERPL-MCNC: 72 MG/DL
CO2 SERPL-SCNC: 22 MMOL/L
CREAT SERPL-MCNC: 0.69 MG/DL
EGFR: 124 ML/MIN/1.73M2
EOSINOPHIL # BLD AUTO: 0.17 K/UL
EOSINOPHIL NFR BLD AUTO: 2.6 %
FERRITIN SERPL-MCNC: 62 NG/ML
GLUCOSE SERPL-MCNC: 95 MG/DL
HCT VFR BLD CALC: 38.9 %
HDLC SERPL-MCNC: 38 MG/DL
HGB BLD-MCNC: 13.2 G/DL
IMM GRANULOCYTES NFR BLD AUTO: 0.3 %
INR PPP: 1.09 RATIO
INSULIN SERPL-MCNC: 22.7 UU/ML
LDLC SERPL CALC-MCNC: 19 MG/DL
LYMPHOCYTES # BLD AUTO: 2.16 K/UL
LYMPHOCYTES NFR BLD AUTO: 33 %
MAN DIFF?: NORMAL
MCHC RBC-ENTMCNC: 33.2 PG
MCHC RBC-ENTMCNC: 33.9 GM/DL
MCV RBC AUTO: 97.7 FL
MONOCYTES # BLD AUTO: 0.43 K/UL
MONOCYTES NFR BLD AUTO: 6.6 %
NEUTROPHILS # BLD AUTO: 3.72 K/UL
NEUTROPHILS NFR BLD AUTO: 56.9 %
NONHDLC SERPL-MCNC: 34 MG/DL
PLATELET # BLD AUTO: 321 K/UL
POTASSIUM SERPL-SCNC: 4.1 MMOL/L
PROT SERPL-MCNC: 7.2 G/DL
PT BLD: 12.8 SEC
RBC # BLD: 3.98 M/UL
RBC # FLD: 13.4 %
SODIUM SERPL-SCNC: 141 MMOL/L
TRIGL SERPL-MCNC: 75 MG/DL
WBC # FLD AUTO: 6.54 K/UL

## 2022-12-19 ENCOUNTER — APPOINTMENT (OUTPATIENT)
Dept: INTERVENTIONAL RADIOLOGY/VASCULAR | Facility: CLINIC | Age: 27
End: 2022-12-19
Payer: COMMERCIAL

## 2022-12-19 PROCEDURE — 99203 OFFICE O/P NEW LOW 30 MIN: CPT | Mod: 95

## 2022-12-19 RX ORDER — SEMAGLUTIDE 1.34 MG/ML
4 INJECTION, SOLUTION SUBCUTANEOUS
Qty: 3 | Refills: 1 | Status: DISCONTINUED | COMMUNITY
Start: 2022-09-01 | End: 2022-12-19

## 2022-12-19 RX ORDER — CYANOCOBALAMIN
1000 KIT
Refills: 0 | Status: ACTIVE | COMMUNITY

## 2023-01-02 ENCOUNTER — OUTPATIENT (OUTPATIENT)
Dept: OUTPATIENT SERVICES | Facility: HOSPITAL | Age: 28
LOS: 1 days | End: 2023-01-02
Payer: COMMERCIAL

## 2023-01-02 DIAGNOSIS — Z11.52 ENCOUNTER FOR SCREENING FOR COVID-19: ICD-10-CM

## 2023-01-02 LAB — SARS-COV-2 RNA SPEC QL NAA+PROBE: SIGNIFICANT CHANGE UP

## 2023-01-02 PROCEDURE — U0003: CPT

## 2023-01-02 PROCEDURE — U0005: CPT

## 2023-01-02 PROCEDURE — C9803: CPT

## 2023-01-03 LAB
ALBUMIN SERPL ELPH-MCNC: 4.4 G/DL
ALP BLD-CCNC: 73 U/L
ALT SERPL-CCNC: 104 U/L
ANION GAP SERPL CALC-SCNC: 14 MMOL/L
AST SERPL-CCNC: 51 U/L
BASOPHILS # BLD AUTO: 0.04 K/UL
BASOPHILS NFR BLD AUTO: 0.6 %
BILIRUB SERPL-MCNC: 0.6 MG/DL
BUN SERPL-MCNC: 11 MG/DL
CALCIUM SERPL-MCNC: 10.2 MG/DL
CHLORIDE SERPL-SCNC: 101 MMOL/L
CO2 SERPL-SCNC: 21 MMOL/L
CREAT SERPL-MCNC: 0.72 MG/DL
EGFR: 119 ML/MIN/1.73M2
EOSINOPHIL # BLD AUTO: 0.23 K/UL
EOSINOPHIL NFR BLD AUTO: 3.4 %
GLUCOSE SERPL-MCNC: 104 MG/DL
HCT VFR BLD CALC: 38.6 %
HGB BLD-MCNC: 13.5 G/DL
IMM GRANULOCYTES NFR BLD AUTO: 0.4 %
INR PPP: 1.03 RATIO
LYMPHOCYTES # BLD AUTO: 2.16 K/UL
LYMPHOCYTES NFR BLD AUTO: 31.6 %
MAN DIFF?: NORMAL
MCHC RBC-ENTMCNC: 32.7 PG
MCHC RBC-ENTMCNC: 35 GM/DL
MCV RBC AUTO: 93.5 FL
MONOCYTES # BLD AUTO: 0.53 K/UL
MONOCYTES NFR BLD AUTO: 7.8 %
NEUTROPHILS # BLD AUTO: 3.84 K/UL
NEUTROPHILS NFR BLD AUTO: 56.2 %
PLATELET # BLD AUTO: 344 K/UL
POTASSIUM SERPL-SCNC: 4.1 MMOL/L
PROT SERPL-MCNC: 7.2 G/DL
PT BLD: 12.2 SEC
RBC # BLD: 4.13 M/UL
RBC # FLD: 13.7 %
SODIUM SERPL-SCNC: 136 MMOL/L
WBC # FLD AUTO: 6.83 K/UL

## 2023-01-04 ENCOUNTER — RESULT REVIEW (OUTPATIENT)
Age: 28
End: 2023-01-04

## 2023-01-04 ENCOUNTER — OUTPATIENT (OUTPATIENT)
Dept: OUTPATIENT SERVICES | Facility: HOSPITAL | Age: 28
LOS: 1 days | End: 2023-01-04
Payer: COMMERCIAL

## 2023-01-04 ENCOUNTER — TRANSCRIPTION ENCOUNTER (OUTPATIENT)
Age: 28
End: 2023-01-04

## 2023-01-04 VITALS
OXYGEN SATURATION: 97 % | TEMPERATURE: 98 F | WEIGHT: 207.9 LBS | DIASTOLIC BLOOD PRESSURE: 86 MMHG | HEIGHT: 63 IN | SYSTOLIC BLOOD PRESSURE: 135 MMHG | HEART RATE: 78 BPM | RESPIRATION RATE: 17 BRPM

## 2023-01-04 VITALS
RESPIRATION RATE: 16 BRPM | SYSTOLIC BLOOD PRESSURE: 120 MMHG | DIASTOLIC BLOOD PRESSURE: 76 MMHG | OXYGEN SATURATION: 99 % | HEART RATE: 75 BPM

## 2023-01-04 DIAGNOSIS — R79.89 OTHER SPECIFIED ABNORMAL FINDINGS OF BLOOD CHEMISTRY: ICD-10-CM

## 2023-01-04 LAB
BLD GP AB SCN SERPL QL: NEGATIVE — SIGNIFICANT CHANGE UP
RH IG SCN BLD-IMP: POSITIVE — SIGNIFICANT CHANGE UP
RH IG SCN BLD-IMP: POSITIVE — SIGNIFICANT CHANGE UP

## 2023-01-04 PROCEDURE — 86901 BLOOD TYPING SEROLOGIC RH(D): CPT

## 2023-01-04 PROCEDURE — 47000 NEEDLE BIOPSY OF LIVER PERQ: CPT

## 2023-01-04 PROCEDURE — 88307 TISSUE EXAM BY PATHOLOGIST: CPT | Mod: 26

## 2023-01-04 PROCEDURE — 76942 ECHO GUIDE FOR BIOPSY: CPT | Mod: 26

## 2023-01-04 PROCEDURE — 86900 BLOOD TYPING SEROLOGIC ABO: CPT

## 2023-01-04 PROCEDURE — 88313 SPECIAL STAINS GROUP 2: CPT | Mod: 26

## 2023-01-04 PROCEDURE — 86850 RBC ANTIBODY SCREEN: CPT

## 2023-01-04 PROCEDURE — 76942 ECHO GUIDE FOR BIOPSY: CPT

## 2023-01-04 PROCEDURE — 88313 SPECIAL STAINS GROUP 2: CPT

## 2023-01-04 PROCEDURE — 88307 TISSUE EXAM BY PATHOLOGIST: CPT

## 2023-01-04 RX ORDER — ONDANSETRON 8 MG/1
4 TABLET, FILM COATED ORAL ONCE
Refills: 0 | Status: DISCONTINUED | OUTPATIENT
Start: 2023-01-04 | End: 2023-01-18

## 2023-01-04 RX ORDER — SEMAGLUTIDE 0.68 MG/ML
2 INJECTION, SOLUTION SUBCUTANEOUS
Qty: 0 | Refills: 0 | DISCHARGE

## 2023-01-04 NOTE — ASU DISCHARGE PLAN (ADULT/PEDIATRIC) - NURSING INSTRUCTIONS
Please feel free to contact us at [phone] if any problems arise. After 6PM, Monday through Friday, on weekends and on holidays, please call [phone] and ask for the radiology resident on call to be paged.

## 2023-01-04 NOTE — PRE-ANESTHESIA EVALUATION ADULT - NSANTHOSAYNRD_GEN_A_CORE
No. KYARA screening performed.  STOP BANG Legend: 0-2 = LOW Risk; 3-4 = INTERMEDIATE Risk; 5-8 = HIGH Risk

## 2023-01-04 NOTE — ASU DISCHARGE PLAN (ADULT/PEDIATRIC) - ASU DC SPECIAL INSTRUCTIONSFT
Biopsy Discharge    Discharge Instructions  - You have had a biopsy of your liver.  - You may shower in 24 hours. No soaking or swimming until the site is completely healed.  - Keep the area covered and dry for the next 24 hours.  - Do not perform any heavy lifting for the next few days or until the site is healed.  - You may resume your normal diet.  - You may resume your normal medications however you should wait 48 hours before restarting aspirin, plavix, or blood thinners.  - It is normal to experience some pain over the site for the next few days. You may take apply ice to the area (20 minutes on, 20 minutes off) and take Tylenol for that pain. Do not take more frequently than every 6 hours and do not exceed more than 3000mg of Tylenol in a 24 hour period.    - You were given conscious sedation which may make you drowsy, therefore you need someone to stay with you until the morning following the procedure.  - Do not drive, engage in heavy lifting or strenuous activity, or drink any alcoholic beverages for the next 24 hours.   - You may resume normal activity in 24 hours.    Notify your primary physician and/or Interventional Radiology IMMEDIATELY if you experience any of the following       - Fever of 101F or 38C       - Chills or Rigors/ Shakes       - Swelling and/or Redness in the area around the biopsy site       - Worsening Pain       - Blood soaked bandages or worsening bleeding       - Lightheadedness and/or dizziness upon standing       - Chest Pain/ Tightness       - Shortness of Breath       - Difficulty walking    If you have a problem that you believe requires IMMEDIATE attention, please go to your NEAREST Emergency Room. If you believe your problem can safely wait until you speak to a physician, please call Interventional Radiology for any concerns.    During Normal Weekday Business Hours- You can contact the Interventional Radiology department during normal business hours via telephone.  During Evenings and Weekends- If you need to contact Interventional Radiology during off hours, do so by calling the hospital and requesting to be connected to the Interventional Radiologist on call.

## 2023-01-04 NOTE — ASU DISCHARGE PLAN (ADULT/PEDIATRIC) - NS MD DC FALL RISK RISK
For information on Fall & Injury Prevention, visit: [email or web address] OR  [email or web address] OR  https://www.cdc.gov/steadi/patient.html

## 2023-01-04 NOTE — PROCEDURE NOTE - PROCEDURE FINDINGS AND DETAILS
technically successful US guided hepatic parenchymal biopsy. the biopsy device was removed and hemostasis achieved with manual compression. a dry sterile dressing was applied.

## 2023-01-04 NOTE — PRE PROCEDURE NOTE - PRE PROCEDURE EVALUATION
Vascular & Interventional Radiology Pre-Procedure Note    Procedure Name: image guided percutaneous liver biopsy    HPI: 27y Female with hemochromatosis and fibrosis presents for image guided percutaneous liver biopsy.    Allergies: NKDA    Medications: Home Medications: None        Plan:   -27y Female presents for image guided percutaneous liver biopsy  -Risks/Benefits/alternatives explained with the patient and witnessed informed consent obtained.

## 2023-01-05 LAB — SURGICAL PATHOLOGY STUDY: SIGNIFICANT CHANGE UP

## 2023-01-13 ENCOUNTER — TRANSCRIPTION ENCOUNTER (OUTPATIENT)
Age: 28
End: 2023-01-13

## 2023-01-13 DIAGNOSIS — E83.119 HEMOCHROMATOSIS, UNSPECIFIED: ICD-10-CM

## 2023-02-08 ENCOUNTER — APPOINTMENT (OUTPATIENT)
Dept: HEPATOLOGY | Facility: CLINIC | Age: 28
End: 2023-02-08
Payer: COMMERCIAL

## 2023-02-08 VITALS
OXYGEN SATURATION: 97 % | WEIGHT: 216 LBS | HEIGHT: 63.86 IN | TEMPERATURE: 98 F | HEART RATE: 75 BPM | BODY MASS INDEX: 37.33 KG/M2 | SYSTOLIC BLOOD PRESSURE: 126 MMHG | RESPIRATION RATE: 16 BRPM | DIASTOLIC BLOOD PRESSURE: 78 MMHG

## 2023-02-08 PROCEDURE — 99214 OFFICE O/P EST MOD 30 MIN: CPT

## 2023-02-08 PROCEDURE — 99072 ADDL SUPL MATRL&STAF TM PHE: CPT

## 2023-02-08 NOTE — ASSESSMENT
[FreeTextEntry1] : This is a 27-year-old female with past medical history of obesity, compound heterozygous for hemochromatosis mutation (C282Y, H63D), presents for follow-up.  She also has history of elevated ferritin level as well as hepatic steatosis and mild iron overload on recent MRI.  She has persistent transaminitis with normal bilirubin level despite being on Ozempic (being prescribed for weight loss).  She was further evaluated with etiological work-up for underlying chronic liver disease which was essentially unremarkable except for a mild nonspecific low titer positive test results for smooth muscle antibody (1:20).  \par I suspect patient has nonalcoholic fatty liver disease with ALMEIDA.  Elevated ferritin level is not uncommon in patients with fatty liver disease and likely explain this finding.  However she does have compound heterozygous for hemochromatosis which may explain her high ferritin level in the past.  She does have significant insulin resistance as measured by cristhian IR (8)\par \par Her FibroScan is suggestive of moderate to severe hepatic steatosis without any clinically significant fibrosis. Follow up FibroScan shows CAP score of 357 and LSM of 9.1 from July 26, 2022.   She currently uptitrated to  Ozempic 2 mg weekly. She has been still struggling to lose weight.\par \par A liver biopsy from January 4, 2023  revealed steatohepatitis in the background of marked macrovesicular steatosis.  Portal fibrosis and focal delicate bridging fibrosis, stage 2-3 out of 4 was noted.  \par \par PLAN\par \par #1.  Nonalcoholic steatohepatitis\par I have discussed with her that lifestyle modifications are crucial for management of NAFLD. I recommended gradual weight loss of 5-10% of his body weight through dietary changes and moderate intensity exercise for 20 minutes at least 3 times per week. These changes have been shown to lead to regression or even resolution of steatosis, inflammation, and even fibrosis in some patients.  Patient already has lost a few pounds since last visit.  I have encouraged her to continue in an effort to lose at least 10% of body weight, and recommended her to get involved more in exercises in addition to continuing semaglutide for her weight loss efforts.\par \par Her FibroScan revealed F2 fibrosis with moderate hepatic steatosis was noted.\par Her liver biopsy on 1/4/23 shows 2-3 out of 4 portal fibrosis and focal delicate bridging fibrosis\par \par She currently remains on Ozempic subcutaneously 2 mg q weekly.  She is tolerating this treatment well.  She has only lost about 10 pounds of weight since initiation of Ozempic.\par \par I recommended a follow-up CMP today.  Prior CBC and INR within the last 3 months were unremarkable.\par \par We discussed potential possibilities of enrolling in clinical trials.  Patient is interested to learn more about available trials at our center.  Our research manager is going to reach out to patient with additional information.\par \par #2.  Elevated ferritin level\par Patient has not compound heterozygous status for hemochromatosis.  She has mildly elevated ferritin level to the range of 400 which is improved with phlebotomies ( now < 100).  Ferritin level is not uncommon in patients with nonalcoholic fatty liver disease, and likely not pathogenic condition.  Patient however has a strong family history of hemochromatosis (father side) and she is concerned about it.  She will continue to remain on phlebotomy schedule to give her ferritin level below 100.\par \par Return to hepatology clinic in 3 months.\par

## 2023-02-08 NOTE — PHYSICAL EXAM
[General Appearance - Alert] : alert [General Appearance - In No Acute Distress] : in no acute distress [Auscultation Breath Sounds / Voice Sounds] : lungs were clear to auscultation bilaterally [Heart Rate And Rhythm] : heart rate was normal and rhythm regular [Heart Sounds] : normal S1 and S2 [Heart Sounds Gallop] : no gallops [Murmurs] : no murmurs [Heart Sounds Pericardial Friction Rub] : no pericardial rub [Bowel Sounds] : normal bowel sounds [Abdomen Soft] : soft [Abdomen Tenderness] : non-tender [Abdomen Mass (___ Cm)] : no abdominal mass palpated [Abnormal Walk] : normal gait [Nail Clubbing] : no clubbing  or cyanosis of the fingernails [Musculoskeletal - Swelling] : no joint swelling seen [Motor Tone] : muscle strength and tone were normal [Skin Color & Pigmentation] : normal skin color and pigmentation [Skin Turgor] : normal skin turgor [] : no rash [Oriented To Time, Place, And Person] : oriented to person, place, and time [Impaired Insight] : insight and judgment were intact [Affect] : the affect was normal [Abdominal  Ascites] : no ascites [Jaundice] : No jaundice

## 2023-02-08 NOTE — HISTORY OF PRESENT ILLNESS
[FreeTextEntry1] : History based on initial hepatology clinic visit on 8 June 2021:\par \par Ms. KELLY WHALEN a 25 year White female  presents today for  a hepatology appointment. She has been a patient  KENNA RAMIREZ .  She presents to the clinic today accompanied by her mother.\par \par This is a 25-year-old-year-old female who presents for further evaluation for elevated liver test and ferritin level.  Patient has been following at the [facility] for elevated ferritin level.  She has apparently been diagnosed with compound heterozygous for hemochromatosis (C282Y, H63D).  Patient reports that her father has been diagnosed with cirrhosis of the liver about more than 20 years ago.  Her father was also diagnosed with cirrhosis of the liver and type 2 diabetes mellitus.\par \par Patient is morbidly obese otherwise no other medical problems other than as noted above.\par \par Patient currently remains on phlebotomy protocol every 4 weekly and has already performed about 6-7 infections over the last 6 months.  Her ferritin level was in the range of around 400 to initiation of phlebotomies but has now around 100.  Recent blood test results March 2, 2020 was reviewed.  And now his fibrosis is a revealing moderate steatosis, and F0 fibrosis.\par \par Patient also had an MRI done which revealed moderate to severe steatosis and mild increased iron deposits.  No evidence of cirrhosis was noted.\par \par Interval history dated July 1, 2021\par \awa Gonzales presents for hepatology follow-up appointment after being last seen in hepatology clinic on July 2021.  Since last seen patient has lost about 5 pounds of weight with diet and exercise.  She otherwise has no new symptoms today.\par \par We reviewed all the test results since last clinic visit.  Laboratory test results from 2 May 2021 revealed normal CBC, minimal elevation of INR (1.2, suspect presentation under).  Review of his liver function test revealed total bilirubin 1.5, AST 53, ALT 88, alkaline phosphatase 60.  Serum creatinine was 0.66 mg/dL.  Serum sodium 139, potassium 3.3 mmol/L.\par Etiological work-up for underlying chronic liver disease were essentially unremarkable.  Specifically viral serology for hepatitis B were negative including hepatitis B surface antigen, hepatitis B core antibody total, hepatitis B surface antibody.  Hepatitis A IgG antibody was positive suggesting immunity.  Please with transglutaminase IgG antibody was negative.  Autoimmune markers were minimally positive for smooth muscle antibody (1;20, likely nonspecific) but other autoimmune markers such as anti-LK M, mitochondrial antibody, SURINDER, soluble liver antigen antibody were negative.  Additionally alpha-1 antitrypsin level was within normal range.  Lysosomal acid lipase activity was within normal range.  Ferritin level has down trended 244 ng/mL.  \par Patient currently remains on phlebotomy schedule every 3 months.\par Patient had a FibroScan done on July 1, 2021 which revealed median seal with speed of 1.45 m/s which corresponds to a median liver stiffness score of 6.3 kPa and a CAP score of 355 dB/m.  This is consistent with F0 to F1 suggesting none to mild fibrosis and S3, stage III steatosis which is equivalent to about 67% or more of steatosis.\par \par Interval history dated October 6, 2021\awa Gonzales presents for a hepatology follow-up appointment today after being last seen in the hepatology clinic on July 1, 2021.  Since last seen he reports he has been doing well.  However she has gained about 11 pounds of weight since July despite being on diet control.  She reports that she has been trying to cut down her calories and also regularly exercising.\par She also continues to do phlebotomies every 6 weeks due to an elevated ferritin level.  She has not yet vaccinated for hepatitis B and apparently has scheduled an appointment with her PCP.  She is immune to hepatitis A.\par Today, on her follow-up visit she wanted to know if she would be a candidate for any pharmacotherapy.  \par \par Interval history dated November 23, 2021\par steven Patient presents for hepatology follow-up appointment after being last seen in hepatology clinic on October 6, 2021.  Since last seen patient has gained 4 pounds of weight.  She has not been able to do regular exercises and has not been able to maintain a " restricted diet recommendations".  She had 1 session of phlebotomy since last visit.\par Labs reviewed from October 8, 2021 revealed mild dyslipidemia with HDL of 44 mg/dL otherwise normal triglyceride, cholesterol, LDL.  Liver tests revealed persistent elevation with AST 76, ALT 97, alkaline phosphatase 61, total bili 1.4 mg/dL.  She also had additional blood test done on October 29, 2021 which revealed , , alkaline phosphatase 59, total bilirubin 0.5 mg/dL.\par Her serum insulin level was 32.6 microunits/mL.  Fasting blood sugar of 106.  Her MOMA insulin resistance level was 8 suggesting significant insulin resistance.  Her A1c was 5.5%.\par \par Interval history dated January 19th, 2022:\par \par Ms. Whalen presents to clinic today for follow up visit.  She has no complaints at this time.  She was initiated on Ozempic at last visit and has been tolerating it well at 0.25 mg qweekly.  She has lost about 2 lbs since last visit. \par \par There are no new labs to review.  She had her bloodwork drawn earlier today and the results are not back at this time.\par \par Interval history dated April 26, 2022:\par Ms. Whalen presents for hepatology follow-up appointment.  She was last seen in the hepatology clinic on January 19, 2022.  She has been initiated on Ozempic and the dose was increased gradually, and since February 20, 2022, she has been on 1 mg subcutaneously once weekly.  She has been tolerating well.  She has lost about 4 pounds of weight since initiation of Ozempic.  She has not been able to do a strict exercise regimen as of yet.  But she is hopeful that with the weather changing she would be more involved in outdoor exercises.\par We reviewed her labs from 19 January 2022.  This revealed INR 1.06, unremarkable CBC, insulin 21.6 microunits/mL.  Her liver tests were elevated at the time with the total bilirubin 0.4 mg/dL, AST 98, , alkaline phosphatase 70 units/L.  Her hemoglobin A1c was 5.3%.\par She has yet to schedule her liver biopsy.\par \par Interval hx dated July 26, 2022:\par \par Ms. Whalen returns for a follow up. She reports she has been tolerating Ozempic 1 mg sq weekly. She has been walking at least 45 minutes a day. She has also changed her diet, and has cut down on the proportion of her diet by 50%. She has lost about 5 pounds since January. \par \par Follow up FibroScan shows CAP score of 357 and LSM of 9.1 from July 26, 2022.   Labs reviewed as underneath from April 2022. \par \par Interval hx dated October 27, 2022:\par \par Ms. Whalen returns for follow-up visit. She states that she is tolerating the 1mg Ozempic (2mg is on backorder). She complains of lack of appetite but is still struggling to lose weight. She is walking and bike riding for exercise. She denies N/V/D/C.  She denies periods of confusion, pruritus, abdominal pain. She still hasn't had liver biopsy which was recommended during her last visit. She is having phlebotomy procedures about every 8 weeks for high Ferritin level and hemochromatosis (compound heterozygous for C282Y and H63D).\par \par We reviewed her prior blood test results from July 29, 2022 which revealed unremarkable CBC, total bilirubin 0.4 mg/dL, AST 78, ALT 94, alkaline phosphatase 71.  She continues to have persistent elevation of liver test despite being on Ozempic with some weight loss over the last 8 to 9 months.  Her lipid profile is essentially within normal range although HDL was 42 mg/dL.  Iron profile revealed serum iron 124 mcg/dL, TIBC 359 mcg/dL, percentage saturation 34% and ferritin level 77 ng/mL.  Her serum insulin level was 29.6.  INR was 1.06.  Hemoglobin A1c was 5.4%.\par \par Her last FibroScan was from July 26, 2022 which revealed a CAP score of 357 dB/m and liver stiffness score of 9.1 kPa which is suggestive of S3–stage III steatosis, F2–moderate fibrosis.\par \par Interval History February 8, 2023:\par \par Patient denies abdominal pain, nausea, vomiting, constipation, diarrhea.

## 2023-02-10 LAB
ALBUMIN SERPL ELPH-MCNC: 4.9 G/DL
ALP BLD-CCNC: 72 U/L
ALT SERPL-CCNC: 153 U/L
ANION GAP SERPL CALC-SCNC: 14 MMOL/L
AST SERPL-CCNC: 112 U/L
BILIRUB SERPL-MCNC: 0.4 MG/DL
BUN SERPL-MCNC: 11 MG/DL
CALCIUM SERPL-MCNC: 9.9 MG/DL
CHLORIDE SERPL-SCNC: 104 MMOL/L
CO2 SERPL-SCNC: 20 MMOL/L
CREAT SERPL-MCNC: 0.67 MG/DL
EGFR: 124 ML/MIN/1.73M2
GLUCOSE SERPL-MCNC: 105 MG/DL
POTASSIUM SERPL-SCNC: 4.7 MMOL/L
PROT SERPL-MCNC: 7.9 G/DL
SODIUM SERPL-SCNC: 137 MMOL/L

## 2023-04-28 ENCOUNTER — TRANSCRIPTION ENCOUNTER (OUTPATIENT)
Age: 28
End: 2023-04-28

## 2023-05-10 ENCOUNTER — APPOINTMENT (OUTPATIENT)
Dept: HEPATOLOGY | Facility: CLINIC | Age: 28
End: 2023-05-10
Payer: COMMERCIAL

## 2023-05-10 VITALS
HEIGHT: 63 IN | WEIGHT: 214 LBS | RESPIRATION RATE: 16 BRPM | HEART RATE: 70 BPM | DIASTOLIC BLOOD PRESSURE: 84 MMHG | BODY MASS INDEX: 37.92 KG/M2 | SYSTOLIC BLOOD PRESSURE: 121 MMHG | TEMPERATURE: 98.5 F | OXYGEN SATURATION: 97 %

## 2023-05-10 PROCEDURE — 99214 OFFICE O/P EST MOD 30 MIN: CPT

## 2023-05-19 LAB
ALBUMIN SERPL ELPH-MCNC: 4.5 G/DL
ALP BLD-CCNC: 66 U/L
ALT SERPL-CCNC: 83 U/L
ANION GAP SERPL CALC-SCNC: 17 MMOL/L
AST SERPL-CCNC: 68 U/L
BASOPHILS # BLD AUTO: 0.05 K/UL
BASOPHILS NFR BLD AUTO: 0.6 %
BILIRUB SERPL-MCNC: 0.3 MG/DL
BUN SERPL-MCNC: 7 MG/DL
CALCIUM SERPL-MCNC: 10 MG/DL
CHLORIDE SERPL-SCNC: 105 MMOL/L
CHOLEST SERPL-MCNC: 74 MG/DL
CO2 SERPL-SCNC: 21 MMOL/L
CREAT SERPL-MCNC: 0.72 MG/DL
EGFR: 119 ML/MIN/1.73M2
EOSINOPHIL # BLD AUTO: 0.18 K/UL
EOSINOPHIL NFR BLD AUTO: 2 %
ESTIMATED AVERAGE GLUCOSE: 105 MG/DL
FERRITIN SERPL-MCNC: 64 NG/ML
GLUCOSE SERPL-MCNC: 103 MG/DL
HBA1C MFR BLD HPLC: 5.3 %
HCT VFR BLD CALC: 37.8 %
HDLC SERPL-MCNC: 42 MG/DL
HGB BLD-MCNC: 12.5 G/DL
IMM GRANULOCYTES NFR BLD AUTO: 0.2 %
INR PPP: 1.06 RATIO
IRON SATN MFR SERPL: 17 %
IRON SERPL-MCNC: 59 UG/DL
LDLC SERPL CALC-MCNC: 23 MG/DL
LYMPHOCYTES # BLD AUTO: 2 K/UL
LYMPHOCYTES NFR BLD AUTO: 22.3 %
MAN DIFF?: NORMAL
MCHC RBC-ENTMCNC: 32.3 PG
MCHC RBC-ENTMCNC: 33.1 GM/DL
MCV RBC AUTO: 97.7 FL
MONOCYTES # BLD AUTO: 0.63 K/UL
MONOCYTES NFR BLD AUTO: 7 %
NEUTROPHILS # BLD AUTO: 6.08 K/UL
NEUTROPHILS NFR BLD AUTO: 67.9 %
NONHDLC SERPL-MCNC: 32 MG/DL
PLATELET # BLD AUTO: 375 K/UL
POTASSIUM SERPL-SCNC: 4.2 MMOL/L
PROT SERPL-MCNC: 8 G/DL
PT BLD: 12.4 SEC
RBC # BLD: 3.87 M/UL
RBC # FLD: 13.7 %
SODIUM SERPL-SCNC: 142 MMOL/L
TIBC SERPL-MCNC: 347 UG/DL
TRIGL SERPL-MCNC: 43 MG/DL
TSH SERPL-ACNC: 1.55 UIU/ML
UIBC SERPL-MCNC: 288 UG/DL
WBC # FLD AUTO: 8.96 K/UL

## 2023-05-21 NOTE — HISTORY OF PRESENT ILLNESS
[FreeTextEntry1] : Ms. KELLY WHALEN is a 25-year-old white female who presented to the hepatology clinic on 8th June 2021 accompanied by her mother. She sought further evaluation for elevated liver test and ferritin levels, as she had been following up at [facility] for elevated ferritin. The patient has a diagnosis of compound heterozygous hemochromatosis (C282Y, H63D), with her father also being diagnosed with cirrhosis of the liver and type 2 diabetes mellitus.\par \par Apart from being morbidly obese, the patient has no other medical problems. She has been undergoing phlebotomy every 4 weeks and has completed 6-7 sessions over the last 6 months, leading to a decrease in her ferritin levels from around 400 to approximately 100. Recent blood test results from March 2, 2020, revealed moderate steatosis and F0 fibrosis.\par \par MRI findings showed moderate to severe steatosis and mild increased iron deposits, but no evidence of cirrhosis. The patient had a FibroScan performed on July 1, 2021, which indicated mild fibrosis (F0-F1) and stage III steatosis (S3).\par \par On July 1, 2021, during the follow-up appointment, the patient reported a weight loss of 5 pounds through diet and exercise, without any new symptoms. Lab results from May 2, 2021, showed a mildly elevated INR, total bilirubin 1.5, AST 53, ALT 88, alkaline phosphatase 60, serum creatinine 0.66 mg/dL, serum sodium 139, potassium 3.3 mmol/L. Etiological work-up for underlying chronic liver disease was unremarkable, except for a positive smooth muscle antibody (likely nonspecific).\par \par During subsequent visits, the patient continued to have elevated liver tests, mild dyslipidemia, and insulin resistance. She was initiated on Ozempic, gradually increasing the dosage to 1 mg weekly. The patient had intermittent weight fluctuations but managed to lose some weight overall. She remained on a phlebotomy schedule due to high ferritin levels and hemochromatosis. FibroScan results indicated persistent moderate steatosis and moderate fibrosis.\par \par On the latest visit on May 10, 2023, the patient reported feeling well, tolerating Ozempic 2 mg weekly, and losing 2 pounds since February 2023. She had joined a gym and denied any gastrointestinal symptoms. Lab results were reviewed but not provided in the note.\par \par Overall, the patient's history includes a diagnosis of compound heterozygous hemochromatosis, obesity, persistent elevated liver tests, moderate steatosis, and moderate fibrosis. She has been undergoing phlebotomy and receiving treatment with Ozempic for weight management and glycemic control.

## 2023-05-21 NOTE — ASSESSMENT
[FreeTextEntry1] : ASSESSMENT:\par \par Nonalcoholic steatohepatitis (ALMEIDA) with moderate to severe hepatic steatosis and fibrosis:\par \par The patient has a history of obesity and elevated ferritin levels, which are commonly seen in patients with ALMEIDA.\par FibroScan and liver biopsy findings confirm the presence of significant hepatic steatosis and fibrosis (stage 2-3).\par Compound heterozygous for hemochromatosis mutation (C282Y, H63D):\par \par The patient carries the genetic mutations associated with hemochromatosis.\par Although her ferritin levels have improved with phlebotomies, she will continue to undergo phlebotomy treatment to maintain ferritin levels below 100.\par \par PLAN:\par \par #Nonalcoholic steatohepatitis:\par \par -Lifestyle modifications: Emphasize the importance of gradual weight loss through dietary changes and regular exercise.\par -Recommend a target weight loss of 5-10% of body weight.\par -Encourage the patient to continue her current weight loss efforts, aiming for at least a 10% reduction in body weight.\par -Advise moderate intensity exercise for a minimum of 20 minutes, at least three times per week.\par -Continue Ozempic (semaglutide) treatment:\par -Increase the dose to 2 mg subcutaneously once weekly.\par -Monitor the patient's response to treatment and weight loss progress.\par \par Follow-up:\par -Schedule a follow-up visit in 3 months to assess the patient's progress.\par -Order a comprehensive metabolic panel (CMP), complete blood count (CBC), thyroid function tests (TFTs), iron profile, and international normalized ratio (INR) to monitor liver function and related parameters.\par \par Clinical trial options:\par -Discussed the possibility of enrolling in clinical trials with the patient.\par -Provided information on available trials at the center.\par -We will arrange for the research manager to contact the patient with additional information.\par \par Education and support:\par \par -Provide patient education materials on ALMEIDA, lifestyle modifications, and the importance of adherence to the prescribed treatment plan.\par -Offer support and address any concerns or questions the patient may have.\par \par Multidisciplinary approach:\par \par -Collaborate with other healthcare professionals, such as a nutritionist or exercise specialist, to provide comprehensive care and support for the patient's weight loss and management of ALMEIDA.\par \par #Elevated ferritin level:\par \par -Patient does not have primary hemochromatosis but has a family history of the condition.\par -Continue phlebotomy treatment to maintain ferritin levels below 100 ng/mL.\par -Monitor ferritin levels regularly to ensure proper control and avoid iron overload.\par \par Genetic counseling:\par \par -Consider referring the patient for genetic counseling to further discuss her compound heterozygous status for hemochromatosis and its implications.\par \par Follow-up:\par \par Schedule a follow-up appointment in 3 months to assess the patient's progress, monitor liver function, and adjust the treatment plan as needed.

## 2023-08-09 ENCOUNTER — RX CHANGE (OUTPATIENT)
Age: 28
End: 2023-08-09

## 2023-08-09 ENCOUNTER — APPOINTMENT (OUTPATIENT)
Dept: HEPATOLOGY | Facility: CLINIC | Age: 28
End: 2023-08-09
Payer: COMMERCIAL

## 2023-08-09 VITALS
DIASTOLIC BLOOD PRESSURE: 77 MMHG | SYSTOLIC BLOOD PRESSURE: 111 MMHG | OXYGEN SATURATION: 97 % | RESPIRATION RATE: 14 BRPM | TEMPERATURE: 97.6 F | BODY MASS INDEX: 37.56 KG/M2 | HEIGHT: 63 IN | HEART RATE: 77 BPM | WEIGHT: 212 LBS

## 2023-08-09 PROCEDURE — 99214 OFFICE O/P EST MOD 30 MIN: CPT

## 2023-08-09 PROCEDURE — 76981 USE PARENCHYMA: CPT

## 2023-08-09 PROCEDURE — 90739 HEPB VACC 2/4 DOSE ADULT IM: CPT

## 2023-08-09 PROCEDURE — 90471 IMMUNIZATION ADMIN: CPT

## 2023-08-10 ENCOUNTER — RX RENEWAL (OUTPATIENT)
Age: 28
End: 2023-08-10

## 2023-08-20 NOTE — HISTORY OF PRESENT ILLNESS
[FreeTextEntry1] : Ms. KELLY WHALEN is a 27-year-old white female who initially was seen in the hepatology clinic on 8th June 2021 accompanied by her mother. She sought further evaluation for elevated liver test and ferritin levels, as she had been following up at [facility] for elevated ferritin. The patient has a diagnosis of compound heterozygous hemochromatosis (C282Y, H63D), with her father also being diagnosed with cirrhosis of the liver and type 2 diabetes mellitus.  Apart from being morbidly obese, the patient has no other medical problems. She has been undergoing phlebotomy every 4 weeks and has completed 6-7 sessions over the last 6 months, leading to a decrease in her ferritin levels from around 400 to approximately 100. Recent blood test results from March 2, 2020, revealed moderate steatosis and F0 fibrosis.  MRI findings showed moderate to severe steatosis and mild increased iron deposits, but no evidence of cirrhosis. The patient had a FibroScan performed on July 1, 2021, which indicated mild fibrosis (F0-F1) and stage III steatosis (S3).  On July 1, 2021, during the follow-up appointment, the patient reported a weight loss of 5 pounds through diet and exercise, without any new symptoms. Lab results from May 2, 2021, showed a mildly elevated INR, total bilirubin 1.5, AST 53, ALT 88, alkaline phosphatase 60, serum creatinine 0.66 mg/dL, serum sodium 139, potassium 3.3 mmol/L. Etiological work-up for underlying chronic liver disease was unremarkable, except for a positive smooth muscle antibody (likely nonspecific).  During subsequent visits, the patient continued to have elevated liver tests, mild dyslipidemia, and insulin resistance. She was initiated on Ozempic, gradually increasing the dosage to 1 mg weekly. The patient had intermittent weight fluctuations but managed to lose some weight overall. She remained on a phlebotomy schedule due to high ferritin levels and hemochromatosis. FibroScan results indicated persistent moderate steatosis and moderate fibrosis.  On the latest visit on May 10, 2023, the patient reported feeling well, tolerating Ozempic 2 mg weekly, and losing 2 pounds since February 2023. She had joined a gym and denied any gastrointestinal symptoms. Lab results were reviewed but not provided in the note.  Overall, the patient's history includes a diagnosis of compound heterozygous hemochromatosis, obesity, persistent elevated liver tests, moderate steatosis, and moderate fibrosis. She has been undergoing phlebotomy and receiving treatment with Ozempic for weight management and glycemic control.  Patient comes for hepatology follow-up appointment (Aug 9, 2023).  She was last seen in hepatology clinic on May 10, 2023.  She has been overall doing well.  However has not lost any significant weight despite being on Ozempic for almost more than a year now.  She is on max dose at this time.  Her BMI is is about 37 through 38 since April 2022.  Her maximum body weight was 221 pounds in October 2021 and has been down to 212 pounds now.  We reviewed recent blood test results from May 10, 2023 that revealed unremarkable CBC, iron, TSH and A1c.  Liver test revealed persistent elevation although improved.  Total bilirubin is 0.3 mg/dL, AST 68, ALT 83, alkaline phosphatase 66 units/L.  Lipid profile however has remained stable with HDL of 42 mg/dL otherwise unremarkable.

## 2023-08-20 NOTE — ASSESSMENT
[FreeTextEntry1] : ASSESSMENT:  Nonalcoholic steatohepatitis (ALMEIDA) with moderate to severe hepatic steatosis and fibrosis:  The patient has a history of obesity and elevated ferritin levels, which are commonly seen in patients with ALMEIDA. FibroScan and liver biopsy findings confirm the presence of significant hepatic steatosis and fibrosis (stage 2-3). Compound heterozygous for hemochromatosis mutation (C282Y, H63D):  The patient carries the genetic mutations associated with hemochromatosis. Although her ferritin levels have improved with phlebotomies, she will continue to undergo phlebotomy treatment to maintain ferritin levels below 100.  PLAN:  #Nonalcoholic steatohepatitis:  -Lifestyle modifications: Emphasize the importance of gradual weight loss through dietary changes and regular exercise. -Recommend a target weight loss of 5-10% of body weight. -Encourage the patient to continue her current weight loss efforts, aiming for at least a 10% reduction in body weight. -Advise moderate intensity exercise for a minimum of 20 minutes, at least three times per week. -Continue Ozempic (semaglutide) treatment: -She is on a stable dose of  Ozemoic 2 mg subcutaneously once weekly. -Monitor the patient's response to treatment and weight loss progress. -Follow-up labs: CBC, CMP, PT/INR, A1C level, Lipid profile, Insulin level.   Follow-up: -Schedule a follow-up visit in 3 months to assess the patient's progress. -Order a comprehensive metabolic panel (CMP), complete blood count (CBC), thyroid function tests (TFTs), iron profile, and international normalized ratio (INR) to monitor liver function and related parameters.  Clinical trial options: -I have discussed the possibility of enrolling in clinical trials with the patient. -I have provided information on available trials at the center. -We will arrange for the research manager to contact the patient with additional information.  #Elevated ferritin level:  -Patient does not have primary hemochromatosis but has a family history of the condition. -Continue phlebotomy treatment to maintain ferritin levels below 100 ng/mL. -Monitor ferritin levels regularly to ensure proper control and avoid iron overload.  Follow-up:  Schedule a follow-up appointment in 3 months to assess the patient's progress, monitor liver function, and adjust the treatment plan as needed.

## 2023-08-20 NOTE — PHYSICAL EXAM
[General Appearance - In No Acute Distress] : in no acute distress [General Appearance - Alert] : alert [Auscultation Breath Sounds / Voice Sounds] : lungs were clear to auscultation bilaterally [Heart Rate And Rhythm] : heart rate was normal and rhythm regular [Heart Sounds] : normal S1 and S2 [Heart Sounds Gallop] : no gallops [Murmurs] : no murmurs [Heart Sounds Pericardial Friction Rub] : no pericardial rub [Bowel Sounds] : normal bowel sounds [Abdomen Soft] : soft [Abdomen Tenderness] : non-tender [Abnormal Walk] : normal gait [Abdomen Mass (___ Cm)] : no abdominal mass palpated [Nail Clubbing] : no clubbing  or cyanosis of the fingernails [Motor Tone] : muscle strength and tone were normal [Musculoskeletal - Swelling] : no joint swelling seen [Skin Color & Pigmentation] : normal skin color and pigmentation [Skin Turgor] : normal skin turgor [] : no rash [Oriented To Time, Place, And Person] : oriented to person, place, and time [Impaired Insight] : insight and judgment were intact [Affect] : the affect was normal [Abdominal  Ascites] : no ascites [Jaundice] : No jaundice

## 2023-08-28 LAB
ALBUMIN SERPL ELPH-MCNC: 4.7 G/DL
ALP BLD-CCNC: 60 U/L
ALT SERPL-CCNC: 107 U/L
ANION GAP SERPL CALC-SCNC: 14 MMOL/L
AST SERPL-CCNC: 91 U/L
BILIRUB SERPL-MCNC: 0.2 MG/DL
BUN SERPL-MCNC: 8 MG/DL
CALCIUM SERPL-MCNC: 9.5 MG/DL
CHLORIDE SERPL-SCNC: 106 MMOL/L
CHOLEST SERPL-MCNC: 81 MG/DL
CO2 SERPL-SCNC: 20 MMOL/L
CREAT SERPL-MCNC: 0.64 MG/DL
EGFR: 126 ML/MIN/1.73M2
ESTIMATED AVERAGE GLUCOSE: 103 MG/DL
GLUCOSE SERPL-MCNC: 94 MG/DL
HBA1C MFR BLD HPLC: 5.2 %
HDLC SERPL-MCNC: 44 MG/DL
INR PPP: 1.02 RATIO
INSULIN SERPL-MCNC: 23.4 UU/ML
LDLC SERPL CALC-MCNC: 21 MG/DL
NONHDLC SERPL-MCNC: 37 MG/DL
POTASSIUM SERPL-SCNC: 4.3 MMOL/L
PROT SERPL-MCNC: 7.6 G/DL
PT BLD: 11.5 SEC
SODIUM SERPL-SCNC: 141 MMOL/L
TRIGL SERPL-MCNC: 79 MG/DL

## 2023-09-15 ENCOUNTER — APPOINTMENT (OUTPATIENT)
Dept: HEPATOLOGY | Facility: CLINIC | Age: 28
End: 2023-09-15
Payer: COMMERCIAL

## 2023-09-15 DIAGNOSIS — Z23 ENCOUNTER FOR IMMUNIZATION: ICD-10-CM

## 2023-09-15 PROCEDURE — 90471 IMMUNIZATION ADMIN: CPT

## 2023-09-15 PROCEDURE — 90739 HEPB VACC 2/4 DOSE ADULT IM: CPT

## 2023-10-17 ENCOUNTER — TRANSCRIPTION ENCOUNTER (OUTPATIENT)
Age: 28
End: 2023-10-17

## 2023-11-14 ENCOUNTER — NON-APPOINTMENT (OUTPATIENT)
Age: 28
End: 2023-11-14

## 2023-12-05 ENCOUNTER — NON-APPOINTMENT (OUTPATIENT)
Age: 28
End: 2023-12-05

## 2023-12-06 RX ORDER — SEMAGLUTIDE 1.34 MG/ML
4 INJECTION, SOLUTION SUBCUTANEOUS
Qty: 4 | Refills: 3 | Status: DISCONTINUED | COMMUNITY
Start: 2022-07-26 | End: 2023-12-06

## 2023-12-06 RX ORDER — SEMAGLUTIDE 2.68 MG/ML
8 INJECTION, SOLUTION SUBCUTANEOUS
Qty: 3 | Refills: 0 | Status: DISCONTINUED | COMMUNITY
Start: 2023-08-10 | End: 2023-12-06

## 2023-12-19 ENCOUNTER — TRANSCRIPTION ENCOUNTER (OUTPATIENT)
Age: 28
End: 2023-12-19

## 2024-01-14 ENCOUNTER — TRANSCRIPTION ENCOUNTER (OUTPATIENT)
Age: 29
End: 2024-01-14

## 2024-02-12 ENCOUNTER — RX RENEWAL (OUTPATIENT)
Age: 29
End: 2024-02-12

## 2024-02-14 ENCOUNTER — APPOINTMENT (OUTPATIENT)
Dept: HEPATOLOGY | Facility: CLINIC | Age: 29
End: 2024-02-14

## 2024-02-21 ENCOUNTER — APPOINTMENT (OUTPATIENT)
Dept: HEPATOLOGY | Facility: CLINIC | Age: 29
End: 2024-02-21

## 2024-02-27 ENCOUNTER — TRANSCRIPTION ENCOUNTER (OUTPATIENT)
Age: 29
End: 2024-02-27

## 2024-03-21 ENCOUNTER — RX RENEWAL (OUTPATIENT)
Age: 29
End: 2024-03-21

## 2024-03-26 ENCOUNTER — TRANSCRIPTION ENCOUNTER (OUTPATIENT)
Age: 29
End: 2024-03-26

## 2024-03-28 ENCOUNTER — TRANSCRIPTION ENCOUNTER (OUTPATIENT)
Age: 29
End: 2024-03-28

## 2024-04-24 ENCOUNTER — APPOINTMENT (OUTPATIENT)
Dept: HEPATOLOGY | Facility: CLINIC | Age: 29
End: 2024-04-24
Payer: COMMERCIAL

## 2024-04-24 VITALS
HEIGHT: 62 IN | WEIGHT: 200 LBS | DIASTOLIC BLOOD PRESSURE: 81 MMHG | SYSTOLIC BLOOD PRESSURE: 116 MMHG | BODY MASS INDEX: 36.8 KG/M2 | HEART RATE: 64 BPM | OXYGEN SATURATION: 98 % | TEMPERATURE: 97.5 F | RESPIRATION RATE: 14 BRPM

## 2024-04-24 DIAGNOSIS — E83.119 HEMOCHROMATOSIS, UNSPECIFIED: ICD-10-CM

## 2024-04-24 DIAGNOSIS — K76.0 FATTY (CHANGE OF) LIVER, NOT ELSEWHERE CLASSIFIED: ICD-10-CM

## 2024-04-24 DIAGNOSIS — R79.89 OTHER SPECIFIED ABNORMAL FINDINGS OF BLOOD CHEMISTRY: ICD-10-CM

## 2024-04-24 DIAGNOSIS — K75.81 NONALCOHOLIC STEATOHEPATITIS (NASH): ICD-10-CM

## 2024-04-24 PROCEDURE — 99214 OFFICE O/P EST MOD 30 MIN: CPT

## 2024-04-25 PROBLEM — K75.81 METABOLIC DYSFUNCTION-ASSOCIATED STEATOHEPATITIS (MASH): Status: ACTIVE | Noted: 2024-04-24

## 2024-04-28 NOTE — HISTORY OF PRESENT ILLNESS
[FreeTextEntry1] : Ms. KELLY WHALEN is a 28-year-old white female who initially was seen in the hepatology clinic on 8th June 2021 accompanied by her mother. She sought further evaluation for elevated liver test and ferritin levels, as she had been following up at [facility] for elevated ferritin. The patient has a diagnosis of compound heterozygous hemochromatosis (C282Y, H63D), with her father also being diagnosed with cirrhosis of the liver and type 2 diabetes mellitus.  Apart from being morbidly obese, the patient has no other medical problems. She has been undergoing phlebotomy every 4 weeks and has completed 6-7 sessions over the last 6 months, leading to a decrease in her ferritin levels from around 400 to approximately 100. Recent blood test results from March 2, 2020, revealed moderate steatosis and F0 fibrosis.  MRI findings showed moderate to severe steatosis and mild increased iron deposits, but no evidence of cirrhosis. The patient had a FibroScan performed on July 1, 2021, which indicated mild fibrosis (F0-F1) and stage III steatosis (S3).  On July 1, 2021, during the follow-up appointment, the patient reported a weight loss of 5 pounds through diet and exercise, without any new symptoms. Lab results from May 2, 2021, showed a mildly elevated INR, total bilirubin 1.5, AST 53, ALT 88, alkaline phosphatase 60, serum creatinine 0.66 mg/dL, serum sodium 139, potassium 3.3 mmol/L. Etiological work-up for underlying chronic liver disease was unremarkable, except for a positive smooth muscle antibody (likely nonspecific).  During subsequent visits, the patient continued to have elevated liver tests, mild dyslipidemia, and insulin resistance. She was initiated on Ozempic, gradually increasing the dosage to 1 mg weekly. The patient had intermittent weight fluctuations but managed to lose some weight overall. She remained on a phlebotomy schedule due to high ferritin levels and hemochromatosis. FibroScan results indicated persistent moderate steatosis and moderate fibrosis.  On the latest visit on May 10, 2023, the patient reported feeling well, tolerating Ozempic 2 mg weekly, and losing 2 pounds since February 2023. She had joined a gym and denied any gastrointestinal symptoms. Lab results were reviewed but not provided in the note.  Overall, the patient's history includes a diagnosis of compound heterozygous hemochromatosis, obesity, persistent elevated liver tests, moderate steatosis, and moderate fibrosis. She has been undergoing phlebotomy and receiving treatment with Ozempic for weight management and glycemic control.  Aug 9, 2023: She was last seen in hepatology clinic on May 10, 2023.  She has been overall doing well.  However has not lost any significant weight despite being on Ozempic for almost more than a year now.  She is on max dose at this time.  Her BMI is is about 37 through 38 since April 2022.  Her maximum body weight was 221 pounds in October 2021 and has been down to 212 pounds now.  We reviewed recent blood test results from May 10, 2023 that revealed unremarkable CBC, iron, TSH and A1c.  Liver test revealed persistent elevation although improved.  Total bilirubin is 0.3 mg/dL, AST 68, ALT 83, alkaline phosphatase 66 units/L.  Lipid profile however has remained stable with HDL of 42 mg/dL otherwise unremarkable.  April 24, 2024: The patient discontinued Ozempic due to ineffective weight loss and switched to Mounjaro since 09/23, resulting in a 12 lb weight loss. She started at 7.5 mg once a week, then briefly increased to 10 mg but reverted to 7.5 mg due to vomiting. She is now tolerating 7.5 mg well and desires a dose increase. She denies gastrointestinal symptoms. Increased physical activity from walking and a  diet with no fried foods and more greens likely contribute to her weight loss. She has not undergone phlebotomy in 8 months.

## 2024-04-28 NOTE — ASSESSMENT
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 The patient has a history of obesity and elevated ferritin levels, which are commonly seen in patients with ALMEIDA. FibroScan and liver biopsy findings confirm the presence of significant hepatic steatosis and fibrosis (stage 2-3). Compound heterozygous for hemochromatosis mutation (C282Y, H63D). Although her ferritin levels have improved with phlebotomies, she will continue to undergo phlebotomy treatment to maintain ferritin levels below 100.  PLAN: # Metabolic Dysfunction Associated Steatoic Liver Disease (MASLD) -We have discussed with him that lifestyle modifications are crucial for management of MASLD. Encouraged patient to follow a diet with daily vegetables, fruits, whole grains and healthy fats, such as a Mediterranean diet. I spoke with the patient at length regarding fatty liver disease and reviewed the spectrum of disease as well as the risk of disease progression. I have explained that patient with fatty liver disease may progress to the development of cirrhosis and its complications, and that fatty liver disease may develop liver cancer without cirrhosis and therefore should be screen yearly with an abdominal ultrasound. I explained that weight loss could improve hepatic steatosis and possibly fibrosis. I recommended gradual weight loss of 5-10% of his body weight through dietary changes and moderate intensity exercise for 20 minutes at least 3 times per week. These changes have been shown to lead to regression or even resolution of steatosis, inflammation, and even fibrosis in some patients. -We have also discussed the recent FDA-approved pharmacologic treatment options REZDIFFRA for MASH (Metabolic Dysfunction Associated Steatohepatitis). She is willing to get started on it. Risks/Benefits discussed.  -We will increase Mounjaro to 10 mg/week. She is tolerating fine. Monitor the patient's response to treatment and weight loss progress. -Follow-up labs: CBC, CMP, A1C level, and iron studies -We will obtain a follow up fibroscan prior to starting REZDIFFRA. -Schedule a follow-up visit in 3 months to assess the patient's progress. -Order a comprehensive metabolic panel (CMP), complete blood count (CBC), thyroid function tests (TFTs), iron profile, and international normalized ratio (INR) to monitor liver function and related parameters. -fibroscan results -I have discussed the possibility of enrolling in clinical trials with the patient. She is hesitant at this time.  #Elevated ferritin level: -Patient does not have primary hemochromatosis but has a family history of the condition. -Continue phlebotomy treatment to maintain ferritin levels below 100 ng/mL. -Monitor ferritin levels regularly to ensure proper control and avoid iron overload. Ordered today  Follow-up: Schedule a follow-up appointment in 3 months to assess the patient's progress, monitor liver function, and adjust the treatment plan as needed.

## 2024-04-28 NOTE — PHYSICAL EXAM
[General Appearance - Alert] : alert [General Appearance - In No Acute Distress] : in no acute distress [Auscultation Breath Sounds / Voice Sounds] : lungs were clear to auscultation bilaterally [Heart Rate And Rhythm] : heart rate was normal and rhythm regular [Heart Sounds] : normal S1 and S2 [Heart Sounds Gallop] : no gallops [Murmurs] : no murmurs [Heart Sounds Pericardial Friction Rub] : no pericardial rub [Bowel Sounds] : normal bowel sounds [Abdomen Tenderness] : non-tender [Abdomen Soft] : soft [Abdomen Mass (___ Cm)] : no abdominal mass palpated [Abnormal Walk] : normal gait [Nail Clubbing] : no clubbing  or cyanosis of the fingernails [Motor Tone] : muscle strength and tone were normal [Musculoskeletal - Swelling] : no joint swelling seen [Skin Color & Pigmentation] : normal skin color and pigmentation [Skin Turgor] : normal skin turgor [] : no rash [Oriented To Time, Place, And Person] : oriented to person, place, and time [Impaired Insight] : insight and judgment were intact [Affect] : the affect was normal [Abdominal  Ascites] : no ascites [Jaundice] : No jaundice

## 2024-04-29 LAB
ALBUMIN SERPL ELPH-MCNC: 4.7 G/DL
ALP BLD-CCNC: 60 U/L
ALT SERPL-CCNC: 34 U/L
ANION GAP SERPL CALC-SCNC: 13 MMOL/L
AST SERPL-CCNC: 27 U/L
BASOPHILS # BLD AUTO: 0.04 K/UL
BASOPHILS NFR BLD AUTO: 0.4 %
BILIRUB SERPL-MCNC: 0.3 MG/DL
BUN SERPL-MCNC: 10 MG/DL
CALCIUM SERPL-MCNC: 9.7 MG/DL
CHLORIDE SERPL-SCNC: 104 MMOL/L
CO2 SERPL-SCNC: 23 MMOL/L
CREAT SERPL-MCNC: 0.65 MG/DL
EGFR: 124 ML/MIN/1.73M2
EOSINOPHIL # BLD AUTO: 0.19 K/UL
EOSINOPHIL NFR BLD AUTO: 2.1 %
ESTIMATED AVERAGE GLUCOSE: 100 MG/DL
FERRITIN SERPL-MCNC: 57 NG/ML
GLUCOSE SERPL-MCNC: 99 MG/DL
HBA1C MFR BLD HPLC: 5.1 %
HCT VFR BLD CALC: 39.9 %
HGB BLD-MCNC: 13.9 G/DL
IMM GRANULOCYTES NFR BLD AUTO: 0.3 %
IRON SATN MFR SERPL: 19 %
IRON SERPL-MCNC: 64 UG/DL
LYMPHOCYTES # BLD AUTO: 2.01 K/UL
LYMPHOCYTES NFR BLD AUTO: 21.7 %
MAN DIFF?: NORMAL
MCHC RBC-ENTMCNC: 33.8 PG
MCHC RBC-ENTMCNC: 34.8 GM/DL
MCV RBC AUTO: 97.1 FL
MONOCYTES # BLD AUTO: 0.5 K/UL
MONOCYTES NFR BLD AUTO: 5.4 %
NEUTROPHILS # BLD AUTO: 6.48 K/UL
NEUTROPHILS NFR BLD AUTO: 70.1 %
PLATELET # BLD AUTO: 292 K/UL
POTASSIUM SERPL-SCNC: 4.1 MMOL/L
PROT SERPL-MCNC: 7.5 G/DL
RBC # BLD: 4.11 M/UL
RBC # FLD: 13 %
SODIUM SERPL-SCNC: 140 MMOL/L
TIBC SERPL-MCNC: 332 UG/DL
UIBC SERPL-MCNC: 269 UG/DL
WBC # FLD AUTO: 9.25 K/UL

## 2024-05-01 ENCOUNTER — APPOINTMENT (OUTPATIENT)
Dept: ULTRASOUND IMAGING | Facility: CLINIC | Age: 29
End: 2024-05-01
Payer: COMMERCIAL

## 2024-05-01 ENCOUNTER — TRANSCRIPTION ENCOUNTER (OUTPATIENT)
Age: 29
End: 2024-05-01

## 2024-05-01 PROCEDURE — 76700 US EXAM ABDOM COMPLETE: CPT

## 2024-05-02 DIAGNOSIS — E66.9 OBESITY, UNSPECIFIED: ICD-10-CM

## 2024-05-02 RX ORDER — TIRZEPATIDE 10 MG/.5ML
10 INJECTION, SOLUTION SUBCUTANEOUS
Qty: 1 | Refills: 3 | Status: DISCONTINUED | COMMUNITY
Start: 2023-08-21 | End: 2024-05-02

## 2024-05-02 RX ORDER — TIRZEPATIDE 7.5 MG/.5ML
7.5 INJECTION, SOLUTION SUBCUTANEOUS
Qty: 2 | Refills: 0 | Status: DISCONTINUED | COMMUNITY
Start: 2024-02-12 | End: 2024-05-02

## 2024-05-06 ENCOUNTER — TRANSCRIPTION ENCOUNTER (OUTPATIENT)
Age: 29
End: 2024-05-06

## 2024-06-24 RX ORDER — TIRZEPATIDE 10 MG/.5ML
10 INJECTION, SOLUTION SUBCUTANEOUS
Qty: 4 | Refills: 1 | Status: ACTIVE | COMMUNITY
Start: 2024-05-02 | End: 1900-01-01

## 2024-07-09 ENCOUNTER — APPOINTMENT (OUTPATIENT)
Dept: HEPATOLOGY | Facility: CLINIC | Age: 29
End: 2024-07-09
Payer: COMMERCIAL

## 2024-07-09 PROCEDURE — 76981 USE PARENCHYMA: CPT

## 2024-07-17 DIAGNOSIS — E66.9 OBESITY, UNSPECIFIED: ICD-10-CM

## 2024-07-17 RX ORDER — RESMETIROM 80 MG/1
80 TABLET, COATED ORAL
Qty: 30 | Refills: 5 | Status: ACTIVE | COMMUNITY
Start: 2024-07-17 | End: 1900-01-01

## 2024-07-23 DIAGNOSIS — K75.81 NONALCOHOLIC STEATOHEPATITIS (NASH): ICD-10-CM

## 2024-08-07 ENCOUNTER — LABORATORY RESULT (OUTPATIENT)
Age: 29
End: 2024-08-07

## 2024-08-22 ENCOUNTER — NON-APPOINTMENT (OUTPATIENT)
Age: 29
End: 2024-08-22

## 2024-09-09 ENCOUNTER — TRANSCRIPTION ENCOUNTER (OUTPATIENT)
Age: 29
End: 2024-09-09

## 2024-09-26 ENCOUNTER — TRANSCRIPTION ENCOUNTER (OUTPATIENT)
Age: 29
End: 2024-09-26

## 2024-10-09 DIAGNOSIS — K75.81 NONALCOHOLIC STEATOHEPATITIS (NASH): ICD-10-CM

## 2024-10-23 ENCOUNTER — APPOINTMENT (OUTPATIENT)
Dept: HEPATOLOGY | Facility: CLINIC | Age: 29
End: 2024-10-23

## 2024-10-23 VITALS
OXYGEN SATURATION: 97 % | RESPIRATION RATE: 16 BRPM | HEIGHT: 62 IN | BODY MASS INDEX: 37.73 KG/M2 | TEMPERATURE: 96.3 F | WEIGHT: 205 LBS | DIASTOLIC BLOOD PRESSURE: 84 MMHG | HEART RATE: 70 BPM | SYSTOLIC BLOOD PRESSURE: 122 MMHG

## 2024-10-23 DIAGNOSIS — K75.81 NONALCOHOLIC STEATOHEPATITIS (NASH): ICD-10-CM

## 2024-10-23 DIAGNOSIS — E83.119 HEMOCHROMATOSIS, UNSPECIFIED: ICD-10-CM

## 2024-10-23 DIAGNOSIS — E66.9 OBESITY, UNSPECIFIED: ICD-10-CM

## 2024-10-23 PROCEDURE — 99204 OFFICE O/P NEW MOD 45 MIN: CPT

## 2024-10-28 DIAGNOSIS — R79.89 OTHER SPECIFIED ABNORMAL FINDINGS OF BLOOD CHEMISTRY: ICD-10-CM

## 2024-12-06 ENCOUNTER — NON-APPOINTMENT (OUTPATIENT)
Age: 29
End: 2024-12-06

## 2024-12-16 ENCOUNTER — TRANSCRIPTION ENCOUNTER (OUTPATIENT)
Age: 29
End: 2024-12-16

## 2025-01-09 ENCOUNTER — TRANSCRIPTION ENCOUNTER (OUTPATIENT)
Age: 30
End: 2025-01-09

## 2025-01-10 ENCOUNTER — TRANSCRIPTION ENCOUNTER (OUTPATIENT)
Age: 30
End: 2025-01-10

## 2025-01-22 ENCOUNTER — APPOINTMENT (OUTPATIENT)
Dept: HEPATOLOGY | Facility: CLINIC | Age: 30
End: 2025-01-22

## 2025-01-27 ENCOUNTER — APPOINTMENT (OUTPATIENT)
Dept: HEPATOLOGY | Facility: CLINIC | Age: 30
End: 2025-01-27
Payer: COMMERCIAL

## 2025-01-27 DIAGNOSIS — E83.119 HEMOCHROMATOSIS, UNSPECIFIED: ICD-10-CM

## 2025-01-27 DIAGNOSIS — K75.81 NONALCOHOLIC STEATOHEPATITIS (NASH): ICD-10-CM

## 2025-01-27 PROCEDURE — 99213 OFFICE O/P EST LOW 20 MIN: CPT | Mod: 95

## 2025-02-03 ENCOUNTER — TRANSCRIPTION ENCOUNTER (OUTPATIENT)
Age: 30
End: 2025-02-03

## 2025-02-20 ENCOUNTER — TRANSCRIPTION ENCOUNTER (OUTPATIENT)
Age: 30
End: 2025-02-20

## 2025-02-24 ENCOUNTER — TRANSCRIPTION ENCOUNTER (OUTPATIENT)
Age: 30
End: 2025-02-24

## 2025-04-11 ENCOUNTER — TRANSCRIPTION ENCOUNTER (OUTPATIENT)
Age: 30
End: 2025-04-11

## 2025-04-11 DIAGNOSIS — K75.81 NONALCOHOLIC STEATOHEPATITIS (NASH): ICD-10-CM

## 2025-04-17 ENCOUNTER — RX CHANGE (OUTPATIENT)
Age: 30
End: 2025-04-17

## 2025-05-13 ENCOUNTER — RX RENEWAL (OUTPATIENT)
Age: 30
End: 2025-05-13

## 2025-07-29 ENCOUNTER — APPOINTMENT (OUTPATIENT)
Dept: HEPATOLOGY | Facility: CLINIC | Age: 30
End: 2025-07-29
Payer: COMMERCIAL

## 2025-07-29 DIAGNOSIS — E83.119 HEMOCHROMATOSIS, UNSPECIFIED: ICD-10-CM

## 2025-07-29 DIAGNOSIS — K75.81 NONALCOHOLIC STEATOHEPATITIS (NASH): ICD-10-CM

## 2025-07-29 PROCEDURE — 99213 OFFICE O/P EST LOW 20 MIN: CPT | Mod: 95
